# Patient Record
Sex: FEMALE | Race: BLACK OR AFRICAN AMERICAN | NOT HISPANIC OR LATINO | Employment: UNEMPLOYED | URBAN - METROPOLITAN AREA
[De-identification: names, ages, dates, MRNs, and addresses within clinical notes are randomized per-mention and may not be internally consistent; named-entity substitution may affect disease eponyms.]

---
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
## 2020-07-13 ENCOUNTER — OFFICE VISIT (OUTPATIENT)
Dept: URGENT CARE | Facility: CLINIC | Age: 20
End: 2020-07-13
Payer: COMMERCIAL

## 2020-07-13 VITALS
TEMPERATURE: 99 F | RESPIRATION RATE: 16 BRPM | SYSTOLIC BLOOD PRESSURE: 138 MMHG | OXYGEN SATURATION: 96 % | HEART RATE: 60 BPM | DIASTOLIC BLOOD PRESSURE: 86 MMHG

## 2020-07-13 DIAGNOSIS — N89.8 VAGINAL DISCHARGE: Primary | ICD-10-CM

## 2020-07-13 PROCEDURE — 99214 OFFICE O/P EST MOD 30 MIN: CPT | Mod: S$GLB,,, | Performed by: PHYSICIAN ASSISTANT

## 2020-07-13 PROCEDURE — 99214 PR OFFICE/OUTPT VISIT, EST, LEVL IV, 30-39 MIN: ICD-10-PCS | Mod: S$GLB,,, | Performed by: PHYSICIAN ASSISTANT

## 2020-07-13 RX ORDER — METRONIDAZOLE 500 MG/1
500 TABLET ORAL EVERY 12 HOURS
Qty: 14 TABLET | Refills: 0 | Status: SHIPPED | OUTPATIENT
Start: 2020-07-13 | End: 2020-07-20

## 2020-07-13 NOTE — PROGRESS NOTES
"Subjective:       Patient ID: Renée Rhoades is a 20 y.o. female.    Vitals:  temperature is 98.6 °F (37 °C). Her blood pressure is 138/86 and her pulse is 60. Her respiration is 16 and oxygen saturation is 96%.     Chief Complaint: Foreign Body in Vagina    Ms. Rhoades is a 21yo female with a PMHx of anxiety and depression who presents to the urgent care for evaluation of "vaginal issue." States that she had her menstrual cycle last week and she thinks she may have possibly forgot to take out a tampon 2 days ago as she is now having a strong vaginal order and thin white vaginal discharge. She denies pain, vaginal bleeding, abdominal pain, N/V/D, fever, chills, headaches, etc. States she wants to make sure there is not a tampon.    Foreign Body in Vagina  The incident occurred 2 days ago. Suspected object: tampon. The foreign body is suspected to be in the vagina. The incident was suspected. Pertinent negatives include no abdominal pain, chest pain, congestion, cough, fever, sore throat or vomiting.       Constitution: Negative for chills, sweating, fatigue and fever.   HENT: Negative for ear pain, congestion and sore throat.    Neck: Negative for neck pain, neck stiffness and painful lymph nodes.   Cardiovascular: Negative for chest pain and leg swelling.   Eyes: Negative for double vision and blurred vision.   Respiratory: Negative for chest tightness, cough and shortness of breath.    Gastrointestinal: Negative for abdominal pain, nausea, vomiting and diarrhea.   Genitourinary: Positive for vaginal discharge and vaginal odor. Negative for dysuria, frequency, urgency, flank pain, hematuria, history of kidney stones, vaginal pain, vaginal bleeding and pelvic pain.   Musculoskeletal: Negative for joint pain, joint swelling, muscle cramps and muscle ache.   Skin: Negative for color change, pale, rash and bruising.   Allergic/Immunologic: Negative for seasonal allergies.   Neurological: Negative for dizziness, history " of vertigo, light-headedness, passing out and headaches.   Hematologic/Lymphatic: Negative for swollen lymph nodes.   Psychiatric/Behavioral: Negative for nervous/anxious, sleep disturbance and depression. The patient is not nervous/anxious.        Objective:      Physical Exam   Constitutional: She is oriented to person, place, and time. She appears well-developed. She is cooperative.  Non-toxic appearance. She does not appear ill. No distress.   Patient is sitting pleasantly on exam table in no acute distress. Nontoxic appearing.    HENT:   Head: Normocephalic and atraumatic.   Right Ear: Hearing, tympanic membrane, external ear and ear canal normal.   Left Ear: Hearing, tympanic membrane, external ear and ear canal normal.   Nose: Nose normal. No mucosal edema, rhinorrhea or nasal deformity. No epistaxis. Right sinus exhibits no maxillary sinus tenderness and no frontal sinus tenderness. Left sinus exhibits no maxillary sinus tenderness and no frontal sinus tenderness.   Mouth/Throat: Uvula is midline, oropharynx is clear and moist and mucous membranes are normal. No trismus in the jaw. Normal dentition. No uvula swelling. No posterior oropharyngeal erythema.   Eyes: Conjunctivae and lids are normal. Right eye exhibits no discharge. Left eye exhibits no discharge. No scleral icterus.   Neck: Trachea normal, normal range of motion, full passive range of motion without pain and phonation normal. Neck supple.   Cardiovascular: Normal rate, regular rhythm, normal heart sounds and normal pulses.   Pulmonary/Chest: Effort normal and breath sounds normal. No respiratory distress.   Abdominal: Soft. Normal appearance and bowel sounds are normal. She exhibits no distension, no pulsatile midline mass and no mass. There is no abdominal tenderness.   Genitourinary: There is no rash, tenderness or lesion on the right labia. There is no rash, tenderness or lesion on the left labia. Right adnexum displays no tenderness. Left  adnexum displays no tenderness.    Vaginal discharge (milky) present.      Genitourinary Comments: No FB observed     Musculoskeletal: Normal range of motion.         General: No deformity.   Neurological: She is alert and oriented to person, place, and time. She exhibits normal muscle tone. Coordination normal.   Skin: Skin is warm, dry, intact, not diaphoretic and not pale.   Psychiatric: Her speech is normal and behavior is normal. Mood, judgment and thought content normal.   Nursing note and vitals reviewed.        Will start on flagyl pending vaginitis panel results. Advised on return/follow-up precautions. Advised on ER precautions. Answered all patient questions. Patient verbalized understanding and voiced agreement with current treatment plan.    Assessment:       1. Vaginal discharge        Plan:         Vaginal discharge  -     NuSwab Vaginitis Plus (VG+)  -     metroNIDAZOLE (FLAGYL) 500 MG tablet; Take 1 tablet (500 mg total) by mouth every 12 (twelve) hours. for 7 days  Dispense: 14 tablet; Refill: 0      Patient Instructions     You were tested for vaginal discharge today, we will call you in 5-7 days with the results of the testing  You were treated for BV today  Please take all antibiotics as directed to completion  If you need more medication when the labs result come in, we will call you and phone them in for you.    Today's testing will give no crediable information if you have unprotected sexual activities going forward.     If your condition worsens or fails to improve we recommend that you receive another evaluation at the ER immediately or contact your PCP to discuss your concerns or return here. You must understand that you've received an urgent care treatment only and that you may be released before all your medical problems are known or treated. You the patient will arrange for followup care as instructed.       Bacterial Vaginosis    You have a vaginal infection called bacterial vaginosis  (BV). Both good and bad bacteria are present in a healthy vagina. BV occurs when these bacteria get out of balance. The number of bad bacteria increase. And the number of good bacteria decrease.  BV may or may not cause symptoms. If symptoms do occur, they can include:  · Thin, gray, milky-white, or sometimes green discharge  · Unpleasant odor or fishy smell  · Itching, burning, or pain in or around the vagina  It is not known what causes BV, but certain factors can make the problem more likely. This can include:  · Douching  · Having sex with a new partner  · Having sex with more than one partner  BV will sometimes go away on its own. But treatment is usually recommended. This is because untreated BV can increase the risk of more serious health problems such as:  · Pelvic inflammatory disease (PID)  ·  delivery (giving birth to a baby early if youre pregnant)  · HIV and certain other sexually transmitted diseases (STDs)  · Infection after surgery on the reproductive organs  Home care  General care  · BV is most often treated with medicines called antibiotics. These may be given as pills or as a vaginal cream. If antibiotics are prescribed, be sure to use them exactly as directed. Also, be sure to complete all of the medicine, even if your symptoms go away.  · Avoid douching or having sex during treatment.  · If you have sex with a female partner, ask your healthcare provider if she should also be treated.  Prevention  · Limit or avoid douching.  · Avoid having sex. If you do have sex, then take steps to lower your risk:  ¨ Use condoms when having sex.  ¨ Limit the number of partners you have sex with.  Follow-up care  Follow up with your healthcare provider, or as advised.  When to seek medical advice  Call your healthcare provider right away if:  · You have a fever of 100.4ºF (38ºC) or higher, or as directed by your provider.  · Your symptoms worsen, or they dont go away within a few days of starting  treatment.  · You have new pain in the lower belly or pelvic region.  · You have side effects that bother you or a reaction to the pills or cream youre prescribed.  · You or any partners you have sex with have new symptoms, such as a rash, joint pain, or sores.  Date Last Reviewed: 7/30/2015  © 1872-8419 [employer]. [address] [place] [address]. All rights reserved. This information is not intended as a substitute for professional medical care. Always follow your healthcare professional's instructions.

## 2020-07-13 NOTE — PATIENT INSTRUCTIONS
You were tested for vaginal discharge today, we will call you in 5-7 days with the results of the testing  You were treated for BV today  Please take all antibiotics as directed to completion  If you need more medication when the labs result come in, we will call you and phone them in for you.    Today's testing will give no crediable information if you have unprotected sexual activities going forward.     If your condition worsens or fails to improve we recommend that you receive another evaluation at the ER immediately or contact your PCP to discuss your concerns or return here. You must understand that you've received an urgent care treatment only and that you may be released before all your medical problems are known or treated. You the patient will arrange for followup care as instructed.       Bacterial Vaginosis    You have a vaginal infection called bacterial vaginosis (BV). Both good and bad bacteria are present in a healthy vagina. BV occurs when these bacteria get out of balance. The number of bad bacteria increase. And the number of good bacteria decrease.  BV may or may not cause symptoms. If symptoms do occur, they can include:  · Thin, gray, milky-white, or sometimes green discharge  · Unpleasant odor or fishy smell  · Itching, burning, or pain in or around the vagina  It is not known what causes BV, but certain factors can make the problem more likely. This can include:  · Douching  · Having sex with a new partner  · Having sex with more than one partner  BV will sometimes go away on its own. But treatment is usually recommended. This is because untreated BV can increase the risk of more serious health problems such as:  · Pelvic inflammatory disease (PID)  ·  delivery (giving birth to a baby early if youre pregnant)  · HIV and certain other sexually transmitted diseases (STDs)  · Infection after surgery on the reproductive organs  Home care  General care  · BV is most often treated with  medicines called antibiotics. These may be given as pills or as a vaginal cream. If antibiotics are prescribed, be sure to use them exactly as directed. Also, be sure to complete all of the medicine, even if your symptoms go away.  · Avoid douching or having sex during treatment.  · If you have sex with a female partner, ask your healthcare provider if she should also be treated.  Prevention  · Limit or avoid douching.  · Avoid having sex. If you do have sex, then take steps to lower your risk:  ¨ Use condoms when having sex.  ¨ Limit the number of partners you have sex with.  Follow-up care  Follow up with your healthcare provider, or as advised.  When to seek medical advice  Call your healthcare provider right away if:  · You have a fever of 100.4ºF (38ºC) or higher, or as directed by your provider.  · Your symptoms worsen, or they dont go away within a few days of starting treatment.  · You have new pain in the lower belly or pelvic region.  · You have side effects that bother you or a reaction to the pills or cream youre prescribed.  · You or any partners you have sex with have new symptoms, such as a rash, joint pain, or sores.  Date Last Reviewed: 7/30/2015  © 3446-2045 [employer]. [address] [place] [address], [place] [address]. All rights reserved. This information is not intended as a substitute for professional medical care. Always follow your healthcare professional's instructions.

## 2020-07-22 ENCOUNTER — TELEPHONE (OUTPATIENT)
Dept: URGENT CARE | Facility: CLINIC | Age: 20
End: 2020-07-22

## 2020-07-22 LAB
A VAGINAE DNA VAG QL NAA+PROBE: ABNORMAL SCORE
BVAB2 DNA VAG QL NAA+PROBE: ABNORMAL SCORE
C ALBICANS DNA VAG QL NAA+PROBE: NEGATIVE
C GLABRATA DNA VAG QL NAA+PROBE: NEGATIVE
C TRACH DNA VAG QL NAA+PROBE: POSITIVE
MEGA1 DNA VAG QL NAA+PROBE: ABNORMAL SCORE
N GONORRHOEA DNA VAG QL NAA+PROBE: NEGATIVE
T VAGINALIS DNA VAG QL NAA+PROBE: NEGATIVE

## 2020-07-22 NOTE — TELEPHONE ENCOUNTER
Attempted to call patient to discuss vaginitis panel results. Patient was positive for BV (appropriately treated at visit with flagyl) and chlamydia (NOT treat at visit). Patient will need azithromycin sent into pharmacy. She will need to be educated on safe sex practices. She will need to notify sexual partners of positive results. Patient did not answer. Left voicemail to return call for test results.

## 2020-07-23 ENCOUNTER — TELEPHONE (OUTPATIENT)
Dept: URGENT CARE | Facility: CLINIC | Age: 20
End: 2020-07-23

## 2020-07-23 DIAGNOSIS — A74.9 CHLAMYDIA: Primary | ICD-10-CM

## 2020-07-23 RX ORDER — AZITHROMYCIN 250 MG/1
TABLET, FILM COATED ORAL
Qty: 4 TABLET | Refills: 0 | Status: ON HOLD | OUTPATIENT
Start: 2020-07-23 | End: 2023-06-23 | Stop reason: HOSPADM

## 2020-07-23 NOTE — TELEPHONE ENCOUNTER
Pt called back. Discussed nuswab results. She was already treated for BV but need to be treated for chlamydia. Sent azithromycin to pharmacy. Instructed her that sexual partners need to be treated. Pelvic rest for 7 days. Pt verbalized understanding and denied further questions.

## 2020-08-11 ENCOUNTER — OFFICE VISIT (OUTPATIENT)
Dept: URGENT CARE | Facility: CLINIC | Age: 20
End: 2020-08-11
Payer: COMMERCIAL

## 2020-08-11 VITALS
TEMPERATURE: 98 F | HEART RATE: 68 BPM | DIASTOLIC BLOOD PRESSURE: 67 MMHG | OXYGEN SATURATION: 97 % | RESPIRATION RATE: 16 BRPM | SYSTOLIC BLOOD PRESSURE: 136 MMHG

## 2020-08-11 DIAGNOSIS — R05.9 COUGH: ICD-10-CM

## 2020-08-11 DIAGNOSIS — R07.9 CHEST PAIN, UNSPECIFIED TYPE: Primary | ICD-10-CM

## 2020-08-11 DIAGNOSIS — R09.82 POST-NASAL DRAINAGE: ICD-10-CM

## 2020-08-11 DIAGNOSIS — R06.02 SHORTNESS OF BREATH: ICD-10-CM

## 2020-08-11 PROCEDURE — 99214 OFFICE O/P EST MOD 30 MIN: CPT | Mod: S$GLB,,, | Performed by: NURSE PRACTITIONER

## 2020-08-11 PROCEDURE — 93010 EKG 12-LEAD: ICD-10-PCS | Mod: S$GLB,,, | Performed by: INTERNAL MEDICINE

## 2020-08-11 PROCEDURE — 93010 ELECTROCARDIOGRAM REPORT: CPT | Mod: S$GLB,,, | Performed by: INTERNAL MEDICINE

## 2020-08-11 PROCEDURE — 71046 XR CHEST PA AND LATERAL: ICD-10-PCS | Mod: S$GLB,,, | Performed by: RADIOLOGY

## 2020-08-11 PROCEDURE — 71046 X-RAY EXAM CHEST 2 VIEWS: CPT | Mod: S$GLB,,, | Performed by: RADIOLOGY

## 2020-08-11 PROCEDURE — 93005 ELECTROCARDIOGRAM TRACING: CPT | Mod: S$GLB,,, | Performed by: NURSE PRACTITIONER

## 2020-08-11 PROCEDURE — 99214 PR OFFICE/OUTPT VISIT, EST, LEVL IV, 30-39 MIN: ICD-10-PCS | Mod: S$GLB,,, | Performed by: NURSE PRACTITIONER

## 2020-08-11 PROCEDURE — U0003 INFECTIOUS AGENT DETECTION BY NUCLEIC ACID (DNA OR RNA); SEVERE ACUTE RESPIRATORY SYNDROME CORONAVIRUS 2 (SARS-COV-2) (CORONAVIRUS DISEASE [COVID-19]), AMPLIFIED PROBE TECHNIQUE, MAKING USE OF HIGH THROUGHPUT TECHNOLOGIES AS DESCRIBED BY CMS-2020-01-R: HCPCS

## 2020-08-11 PROCEDURE — 93005 EKG 12-LEAD: ICD-10-PCS | Mod: S$GLB,,, | Performed by: NURSE PRACTITIONER

## 2020-08-11 RX ORDER — FLUTICASONE PROPIONATE 50 MCG
1 SPRAY, SUSPENSION (ML) NASAL 2 TIMES DAILY
Qty: 15.8 ML | Refills: 0 | Status: ON HOLD | OUTPATIENT
Start: 2020-08-11 | End: 2023-06-23 | Stop reason: HOSPADM

## 2020-08-11 NOTE — PROGRESS NOTES
Subjective:       Patient ID: Renée Rhoades is a 20 y.o. female.    Vitals:  temperature is 98.2 °F (36.8 °C). Her blood pressure is 136/67 and her pulse is 68. Her respiration is 16 and oxygen saturation is 97%.     Chief Complaint: URI and Chest Pain    Pt reports SOB and chest pain x 1-2 months. Symptoms happen only when lying down flat. She has sharp pain in L chest area that resolves within several minutes but SOB persists. She does endorse some SOB with exertion as well. No hx of asthma or lung problems. She also reports post-nasal drip and mucous for the past 1-2 months as well. Mild cough but mostly to clear her throat of mucous. No fever, chills, sore throat, abd pain, n/v/d, anosmia. She wants to be tested for covid today.     URI   This is a new problem. The current episode started 1 to 4 weeks ago. The problem has been unchanged. There has been no fever. Associated symptoms include congestion and coughing. Pertinent negatives include no chest pain, diarrhea, dysuria, headaches, nausea, rash, sore throat or vomiting. Treatments tried: cough drops, cough suppressant.       Constitution: Negative for chills, fatigue and fever.   HENT: Positive for congestion. Negative for sore throat.    Neck: Negative for painful lymph nodes.   Cardiovascular: Negative for chest pain and leg swelling.   Eyes: Negative for double vision and blurred vision.   Respiratory: Positive for cough and shortness of breath.    Gastrointestinal: Negative for nausea, vomiting and diarrhea.   Genitourinary: Negative for dysuria, frequency, urgency and history of kidney stones.   Musculoskeletal: Negative for joint pain, joint swelling, muscle cramps and muscle ache.   Skin: Negative for color change, pale, rash and bruising.   Allergic/Immunologic: Negative for seasonal allergies.   Neurological: Negative for dizziness, history of vertigo, light-headedness, passing out and headaches.   Hematologic/Lymphatic: Negative for swollen lymph  nodes.   Psychiatric/Behavioral: Negative for nervous/anxious, sleep disturbance and depression. The patient is not nervous/anxious.        Objective:      Physical Exam   Constitutional: She is oriented to person, place, and time. She appears well-developed. She is cooperative.  Non-toxic appearance. She does not appear ill. No distress.   HENT:   Head: Normocephalic and atraumatic.   Ears:   Right Ear: Hearing, tympanic membrane, external ear and ear canal normal.   Left Ear: Hearing, tympanic membrane, external ear and ear canal normal.   Nose: Nose normal. No mucosal edema, rhinorrhea or nasal deformity. No epistaxis. Right sinus exhibits no maxillary sinus tenderness and no frontal sinus tenderness. Left sinus exhibits no maxillary sinus tenderness and no frontal sinus tenderness.   Mouth/Throat: Uvula is midline, oropharynx is clear and moist and mucous membranes are normal. No trismus in the jaw. Normal dentition. No uvula swelling. Cobblestoning present. No oropharyngeal exudate, posterior oropharyngeal edema or posterior oropharyngeal erythema.   Eyes: Pupils are equal, round, and reactive to light. Conjunctivae and lids are normal. No scleral icterus. extraocular movement intact  Neck: Trachea normal, full passive range of motion without pain and phonation normal. Neck supple. No neck rigidity. No edema and no erythema present.   Cardiovascular: Normal rate, regular rhythm, normal heart sounds and normal pulses.   Pulmonary/Chest: Effort normal and breath sounds normal. No respiratory distress. She has no decreased breath sounds. She has no wheezes. She has no rhonchi. She has no rales.   Abdominal: Normal appearance.   Musculoskeletal: Normal range of motion.         General: No deformity.   Neurological: She is alert and oriented to person, place, and time. She exhibits normal muscle tone. Coordination normal.   Skin: Skin is warm, dry, intact, not diaphoretic and not pale. Psychiatric: Her speech is  normal and behavior is normal. Judgment and thought content normal.   Nursing note and vitals reviewed.  X-ray Chest Pa And Lateral    Result Date: 8/11/2020  EXAMINATION: XR CHEST PA AND LATERAL CLINICAL HISTORY: Chest pain, unspecified TECHNIQUE: PA and lateral views of the chest were performed. COMPARISON: Chest 08/01/2018 FINDINGS: Cardiomediastinal silhouette is within normal limits.  There is no tracheal abnormality. Lungs are well inflated.  There is no lobar consolidations or pneumothorax or pulmonary vascular congestion or pleural effusions.  There is no significant bony thorax abnormalities.     No acute cardiopulmonary process. Electronically signed by: Cristian Gardner MD Date:    08/11/2020 Time:    10:59    EKG preliminary read: Sinus bradycardia with sinus arrhythmia       Assessment:       1. Chest pain, unspecified type    2. Shortness of breath    3. Cough    4. Post-nasal drainage        Plan:         Chest pain, unspecified type  -     X-Ray Chest PA And Lateral; Future; Expected date: 08/11/2020  -     EKG 12-lead  -     Ambulatory referral/consult to Cardiology    Shortness of breath  -     COVID-19 Routine Screening    Cough  -     fluticasone propionate (FLONASE) 50 mcg/actuation nasal spray; 1 spray (50 mcg total) by Each Nostril route 2 (two) times daily.  Dispense: 15.8 mL; Refill: 0  -     COVID-19 Routine Screening    Post-nasal drainage  -     fluticasone propionate (FLONASE) 50 mcg/actuation nasal spray; 1 spray (50 mcg total) by Each Nostril route 2 (two) times daily.  Dispense: 15.8 mL; Refill: 0         Reviewed previous pertinent office visits, PMH, PSH, fam hx  Referral placed to cardiology  covid test pending. Isolation guidelines discussed.  Start flonase for post-nasal drainage  Advised on return/follow-up precautions. Advised on ER precautions. Answered all patient questions. Patient verbalized understanding and voiced agreement with current treatment plan.    Patient Instructions    Referral to cardiology placed.  Start flonase  Go to the ER if you develop any worsening chest pain    Instructions for Patients with Confirmed or Suspected COVID-19    If you are awaiting your test result, you will either be called or it will be released to the patient portal.  If you have any questions about your test, please visit [email or web address] or call our COVID-19 information line at [phone].      Instructions for non-hospitalized or discharged patients with confirmed or suspected COVID-19:       Stay home except to get medical care.    Separate yourself from other people and animals in your home.    Call ahead before visiting your doctor.    Wear a face mask.    Cover your coughs and sneezes.    Clean your hands often.    Avoid sharing personal household items.    Clean all high-touch surfaces every day.    Monitor your symptoms. Seek prompt medical attention if your illness is worsening (e.g., difficulty breathing). Before seeking care, call your healthcare provider.    If you have a medical emergency and must call 911, notify the dispatcher that you have or are being evaluated for COVID-19. If possible, put on a face mask before emergency medical services arrive.    Use the following symptom-based strategy to return to normal activity following a suspected or confirmed case of COVID-19. Continue isolation until:   o At least 3 days (72 hours) have passed since recovery defined as resolution of fever without the use of fever-reducing medications and improvement in respiratory symptoms (e.g. cough, shortness of breath), and   o At least 10 days have passed since the first positive test.       As one of the next steps, you will receive a call or text from the [place] Department of Health ([facility]) COVID-19 Tracing Team. See the contact information below so you know not to ignore the health departments call. It is important that you contact them back immediately so they can  help.     Contact Tracer Number:  [phone]  Caller ID for most carriers: [facility]    What is contact tracing?   Contact tracing is a process that helps identify everyone who has been in close contact with an infected person. Contact tracers let those people know they may have been exposed and guide them on next steps. Confidentiality is important for everyone; no one will be told who may have exposed them to the virus.   Your involvement is important. The more we know about where and how this virus is spreading, the better chance we have at stopping it from spreading further.  What does exposure mean?   Exposure means you have been within 6 feet for more than 15 minutes with a person who has or had COVID-19.  What kind of questions do the contact tracers ask?   A contact tracer will confirm your basic contact information including name, address, phone number, and next of kin, as well as asking about any symptoms you may have had. Theyll also ask you how you think you may have gotten sick, such as places where you may have been exposed to the virus, and people you were with. Those names will never be shared with anyone outside of that call, and will only be used to help trace and stop the spread of the virus.   I have privacy concerns. How will the state use my information?   Your privacy about your health is important. All calls are completed using call centers that use the appropriate health privacy protection measures (HIPAA compliance), meaning that your patient information is safe. No one will ever ask you any questions related to immigration status. Your health comes first.   Do I have to participate?   You do not have to participate, but we strongly encourage you to. Contact tracing can help us catch and control new outbreaks as theyre developing to keep your friends and family safe.   What if I dont hear from anyone?   If you dont receive a call within 24 hours, you can call the number  above right away to inquire about your status. That line is open from 8:00 am - 8:00 p.m., 7 days a week.  Contact tracing saves lives! Together, we have the power to beat this virus and keep our loved ones and neighbors safe.       Instructions for household members, intimate partners and caregivers in a non-healthcare setting of a patient with confirmed or suspected COVID-19:         Close contacts should monitor their health and call their healthcare provider right away if they develop symptoms suggestive of COVID-19 (e.g., fever, cough, shortness of breath).    Stay home except to get medical care. Separate yourself from other people and animals in the home.   Monitor the patients symptoms. If the patient is getting sicker, call his or her healthcare provider. If the patient has a medical emergency and you need to call 911, notify the dispatch personnel that the patient has or is being evaluated for COVID-19.    Wear a facemask when around other people such as sharing a room or vehicle and before entering a healthcare provider's office.   Cover coughs and sneezes with a tissue. Throw used tissues in a lined trash can immediately and wash hands.   Clean hands often with soap and water for at least 20 seconds or with an alcohol-based hand , rubbing hands together until they feel dry. Avoid touching your eyes, nose, and mouth with unwashed hands.   Clean all high-touch; surfaces every day, including counters, tabletops, doorknobs, bathroom fixtures, toilets, phones, keyboards, tablets, bedside tables, etc. Use a household cleaning spray or wipe according to label instructions.   Avoid sharing personal household items such as dishes, drinking glasses, cups, towels, bedding, etc. After these items are used, they should be washed thoroughly with soap and water.   Continue isolation until:   At least 3 days (72 hours) have passed since recovery defined as resolution of fever without the use of  fever-reducing medications and improvement in respiratory symptoms (e.g. cough, shortness of breath), and    At least 10 days have passed since the patients first positive test.    https://www.cdc.gov/coronavirus/2019-ncov/your-health/index.htm

## 2020-08-11 NOTE — PATIENT INSTRUCTIONS
Referral to cardiology placed.  Start flonase  Go to the ER if you develop any worsening chest pain    Instructions for Patients with Confirmed or Suspected COVID-19    If you are awaiting your test result, you will either be called or it will be released to the patient portal.  If you have any questions about your test, please visit [email or web address] or call our COVID-19 information line at [phone].      Instructions for non-hospitalized or discharged patients with confirmed or suspected COVID-19:       Stay home except to get medical care.    Separate yourself from other people and animals in your home.    Call ahead before visiting your doctor.    Wear a face mask.    Cover your coughs and sneezes.    Clean your hands often.    Avoid sharing personal household items.    Clean all high-touch surfaces every day.    Monitor your symptoms. Seek prompt medical attention if your illness is worsening (e.g., difficulty breathing). Before seeking care, call your healthcare provider.    If you have a medical emergency and must call 911, notify the dispatcher that you have or are being evaluated for COVID-19. If possible, put on a face mask before emergency medical services arrive.    Use the following symptom-based strategy to return to normal activity following a suspected or confirmed case of COVID-19. Continue isolation until:   o At least 3 days (72 hours) have passed since recovery defined as resolution of fever without the use of fever-reducing medications and improvement in respiratory symptoms (e.g. cough, shortness of breath), and   o At least 10 days have passed since the first positive test.       As one of the next steps, you will receive a call or text from the [place] Department of Health ([facility]) COVID-19 Tracing Team. See the contact information below so you know not to ignore the health departments call. It is important that you contact them back immediately so they can help.      Contact Tracer Number:  [phone]  Caller ID for most carriers: [facility]    What is contact tracing?   Contact tracing is a process that helps identify everyone who has been in close contact with an infected person. Contact tracers let those people know they may have been exposed and guide them on next steps. Confidentiality is important for everyone; no one will be told who may have exposed them to the virus.   Your involvement is important. The more we know about where and how this virus is spreading, the better chance we have at stopping it from spreading further.  What does exposure mean?   Exposure means you have been within 6 feet for more than 15 minutes with a person who has or had COVID-19.  What kind of questions do the contact tracers ask?   A contact tracer will confirm your basic contact information including name, address, phone number, and next of kin, as well as asking about any symptoms you may have had. Theyll also ask you how you think you may have gotten sick, such as places where you may have been exposed to the virus, and people you were with. Those names will never be shared with anyone outside of that call, and will only be used to help trace and stop the spread of the virus.   I have privacy concerns. How will the state use my information?   Your privacy about your health is important. All calls are completed using call centers that use the appropriate health privacy protection measures (HIPAA compliance), meaning that your patient information is safe. No one will ever ask you any questions related to immigration status. Your health comes first.   Do I have to participate?   You do not have to participate, but we strongly encourage you to. Contact tracing can help us catch and control new outbreaks as theyre developing to keep your friends and family safe.   What if I dont hear from anyone?   If you dont receive a call within 24 hours, you can call the number above  right away to inquire about your status. That line is open from 8:00 am - 8:00 p.m., 7 days a week.  Contact tracing saves lives! Together, we have the power to beat this virus and keep our loved ones and neighbors safe.       Instructions for household members, intimate partners and caregivers in a non-healthcare setting of a patient with confirmed or suspected COVID-19:         Close contacts should monitor their health and call their healthcare provider right away if they develop symptoms suggestive of COVID-19 (e.g., fever, cough, shortness of breath).    Stay home except to get medical care. Separate yourself from other people and animals in the home.   Monitor the patients symptoms. If the patient is getting sicker, call his or her healthcare provider. If the patient has a medical emergency and you need to call 911, notify the dispatch personnel that the patient has or is being evaluated for COVID-19.    Wear a facemask when around other people such as sharing a room or vehicle and before entering a healthcare provider's office.   Cover coughs and sneezes with a tissue. Throw used tissues in a lined trash can immediately and wash hands.   Clean hands often with soap and water for at least 20 seconds or with an alcohol-based hand , rubbing hands together until they feel dry. Avoid touching your eyes, nose, and mouth with unwashed hands.   Clean all high-touch; surfaces every day, including counters, tabletops, doorknobs, bathroom fixtures, toilets, phones, keyboards, tablets, bedside tables, etc. Use a household cleaning spray or wipe according to label instructions.   Avoid sharing personal household items such as dishes, drinking glasses, cups, towels, bedding, etc. After these items are used, they should be washed thoroughly with soap and water.   Continue isolation until:   At least 3 days (72 hours) have passed since recovery defined as resolution of fever without the use of  fever-reducing medications and improvement in respiratory symptoms (e.g. cough, shortness of breath), and    At least 10 days have passed since the patients first positive test.    https://www.cdc.gov/coronavirus/2019-ncov/your-health/index.htm

## 2020-08-12 ENCOUNTER — TELEPHONE (OUTPATIENT)
Dept: URGENT CARE | Facility: CLINIC | Age: 20
End: 2020-08-12

## 2020-08-12 LAB — SARS-COV-2 RNA RESP QL NAA+PROBE: NOT DETECTED

## 2020-08-12 NOTE — TELEPHONE ENCOUNTER
----- Message from Dawna Valentine PA-C sent at 8/12/2020  8:05 AM CDT -----  COVID-19 negative. Follow up with PCP as needed.

## 2021-08-11 ENCOUNTER — CLINICAL SUPPORT (OUTPATIENT)
Dept: URGENT CARE | Facility: CLINIC | Age: 21
End: 2021-08-11
Payer: COMMERCIAL

## 2021-08-11 DIAGNOSIS — Z11.52 ENCOUNTER FOR SCREENING LABORATORY TESTING FOR COVID-19 VIRUS: Primary | ICD-10-CM

## 2021-08-11 LAB
CTP QC/QA: YES
SARS-COV-2 RDRP RESP QL NAA+PROBE: NEGATIVE

## 2021-08-11 PROCEDURE — U0002: ICD-10-PCS | Mod: QW,S$GLB,, | Performed by: PHYSICIAN ASSISTANT

## 2021-08-11 PROCEDURE — U0002 COVID-19 LAB TEST NON-CDC: HCPCS | Mod: QW,S$GLB,, | Performed by: PHYSICIAN ASSISTANT

## 2021-08-13 ENCOUNTER — TELEPHONE (OUTPATIENT)
Dept: URGENT CARE | Facility: CLINIC | Age: 21
End: 2021-08-13

## 2021-08-13 ENCOUNTER — CLINICAL SUPPORT (OUTPATIENT)
Dept: URGENT CARE | Facility: CLINIC | Age: 21
End: 2021-08-13
Payer: COMMERCIAL

## 2021-08-13 DIAGNOSIS — U07.1 COVID-19 VIRUS INFECTION: Primary | ICD-10-CM

## 2021-08-13 DIAGNOSIS — Z11.52 ENCOUNTER FOR SCREENING LABORATORY TESTING FOR COVID-19 VIRUS: Primary | ICD-10-CM

## 2021-08-13 LAB
CTP QC/QA: YES
SARS-COV-2 RDRP RESP QL NAA+PROBE: POSITIVE

## 2021-08-13 PROCEDURE — U0002 COVID-19 LAB TEST NON-CDC: HCPCS | Mod: QW,S$GLB,, | Performed by: PHYSICIAN ASSISTANT

## 2021-08-13 PROCEDURE — U0002: ICD-10-PCS | Mod: QW,S$GLB,, | Performed by: PHYSICIAN ASSISTANT

## 2022-12-09 ENCOUNTER — INITIAL PRENATAL (OUTPATIENT)
Dept: OBSTETRICS AND GYNECOLOGY | Facility: CLINIC | Age: 22
End: 2022-12-09
Payer: COMMERCIAL

## 2022-12-09 ENCOUNTER — CLINICAL SUPPORT (OUTPATIENT)
Dept: OBSTETRICS AND GYNECOLOGY | Facility: CLINIC | Age: 22
End: 2022-12-09
Payer: COMMERCIAL

## 2022-12-09 ENCOUNTER — LAB VISIT (OUTPATIENT)
Dept: LAB | Facility: HOSPITAL | Age: 22
End: 2022-12-09
Attending: OBSTETRICS & GYNECOLOGY
Payer: COMMERCIAL

## 2022-12-09 VITALS
BODY MASS INDEX: 36.12 KG/M2 | SYSTOLIC BLOOD PRESSURE: 122 MMHG | DIASTOLIC BLOOD PRESSURE: 64 MMHG | WEIGHT: 217.06 LBS

## 2022-12-09 DIAGNOSIS — Z32.01 POSITIVE URINE PREGNANCY TEST: ICD-10-CM

## 2022-12-09 DIAGNOSIS — Z32.01 POSITIVE URINE PREGNANCY TEST: Primary | ICD-10-CM

## 2022-12-09 DIAGNOSIS — Z23 FLU VACCINE NEED: Primary | ICD-10-CM

## 2022-12-09 DIAGNOSIS — Z23 NEEDS FLU SHOT: ICD-10-CM

## 2022-12-09 LAB
ABO + RH BLD: NORMAL
BASOPHILS # BLD AUTO: 0.01 K/UL (ref 0–0.2)
BASOPHILS NFR BLD: 0.2 % (ref 0–1.9)
BLD GP AB SCN CELLS X3 SERPL QL: NORMAL
DIFFERENTIAL METHOD: ABNORMAL
EOSINOPHIL # BLD AUTO: 0 K/UL (ref 0–0.5)
EOSINOPHIL NFR BLD: 0.6 % (ref 0–8)
ERYTHROCYTE [DISTWIDTH] IN BLOOD BY AUTOMATED COUNT: 13.8 % (ref 11.5–14.5)
ESTIMATED AVG GLUCOSE: 82 MG/DL (ref 68–131)
HBA1C MFR BLD: 4.5 % (ref 4–5.6)
HBV SURFACE AG SERPL QL IA: NORMAL
HCT VFR BLD AUTO: 33.9 % (ref 37–48.5)
HCV AB SERPL QL IA: NORMAL
HGB BLD-MCNC: 11.4 G/DL (ref 12–16)
HGB S BLD QL SOLY: NEGATIVE
HIV 1+2 AB+HIV1 P24 AG SERPL QL IA: NORMAL
IMM GRANULOCYTES # BLD AUTO: 0.01 K/UL (ref 0–0.04)
IMM GRANULOCYTES NFR BLD AUTO: 0.2 % (ref 0–0.5)
LYMPHOCYTES # BLD AUTO: 1.9 K/UL (ref 1–4.8)
LYMPHOCYTES NFR BLD: 30.5 % (ref 18–48)
MCH RBC QN AUTO: 26 PG (ref 27–31)
MCHC RBC AUTO-ENTMCNC: 33.6 G/DL (ref 32–36)
MCV RBC AUTO: 77 FL (ref 82–98)
MONOCYTES # BLD AUTO: 0.6 K/UL (ref 0.3–1)
MONOCYTES NFR BLD: 9.8 % (ref 4–15)
NEUTROPHILS # BLD AUTO: 3.7 K/UL (ref 1.8–7.7)
NEUTROPHILS NFR BLD: 58.7 % (ref 38–73)
NRBC BLD-RTO: 0 /100 WBC
PLATELET # BLD AUTO: 272 K/UL (ref 150–450)
PMV BLD AUTO: 10 FL (ref 9.2–12.9)
RBC # BLD AUTO: 4.39 M/UL (ref 4–5.4)
WBC # BLD AUTO: 6.23 K/UL (ref 3.9–12.7)

## 2022-12-09 PROCEDURE — 99999 PR PBB SHADOW E&M-EST. PATIENT-LVL II: CPT | Mod: PBBFAC,,, | Performed by: OBSTETRICS & GYNECOLOGY

## 2022-12-09 PROCEDURE — 86901 BLOOD TYPING SEROLOGIC RH(D): CPT | Performed by: OBSTETRICS & GYNECOLOGY

## 2022-12-09 PROCEDURE — 90686 FLU VACCINE (QUAD) GREATER THAN OR EQUAL TO 3YO PRESERVATIVE FREE IM: ICD-10-PCS | Mod: S$GLB,,, | Performed by: OBSTETRICS & GYNECOLOGY

## 2022-12-09 PROCEDURE — 99999 PR PBB SHADOW E&M-EST. PATIENT-LVL II: ICD-10-PCS | Mod: PBBFAC,,, | Performed by: OBSTETRICS & GYNECOLOGY

## 2022-12-09 PROCEDURE — 85025 COMPLETE CBC W/AUTO DIFF WBC: CPT | Performed by: OBSTETRICS & GYNECOLOGY

## 2022-12-09 PROCEDURE — 86803 HEPATITIS C AB TEST: CPT | Performed by: OBSTETRICS & GYNECOLOGY

## 2022-12-09 PROCEDURE — 90686 IIV4 VACC NO PRSV 0.5 ML IM: CPT | Mod: S$GLB,,, | Performed by: OBSTETRICS & GYNECOLOGY

## 2022-12-09 PROCEDURE — 87491 CHLMYD TRACH DNA AMP PROBE: CPT | Performed by: OBSTETRICS & GYNECOLOGY

## 2022-12-09 PROCEDURE — 86592 SYPHILIS TEST NON-TREP QUAL: CPT | Performed by: OBSTETRICS & GYNECOLOGY

## 2022-12-09 PROCEDURE — 87086 URINE CULTURE/COLONY COUNT: CPT | Performed by: OBSTETRICS & GYNECOLOGY

## 2022-12-09 PROCEDURE — 90471 IMMUNIZATION ADMIN: CPT | Mod: S$GLB,,, | Performed by: OBSTETRICS & GYNECOLOGY

## 2022-12-09 PROCEDURE — 99203 PR OFFICE/OUTPT VISIT, NEW, LEVL III, 30-44 MIN: ICD-10-PCS | Mod: 25,S$GLB,, | Performed by: OBSTETRICS & GYNECOLOGY

## 2022-12-09 PROCEDURE — 87591 N.GONORRHOEAE DNA AMP PROB: CPT | Performed by: OBSTETRICS & GYNECOLOGY

## 2022-12-09 PROCEDURE — 36415 COLL VENOUS BLD VENIPUNCTURE: CPT | Performed by: OBSTETRICS & GYNECOLOGY

## 2022-12-09 PROCEDURE — 99999 PR PBB SHADOW E&M-EST. PATIENT-LVL I: ICD-10-PCS | Mod: PBBFAC,,,

## 2022-12-09 PROCEDURE — 99203 OFFICE O/P NEW LOW 30 MIN: CPT | Mod: 25,S$GLB,, | Performed by: OBSTETRICS & GYNECOLOGY

## 2022-12-09 PROCEDURE — 83036 HEMOGLOBIN GLYCOSYLATED A1C: CPT | Performed by: OBSTETRICS & GYNECOLOGY

## 2022-12-09 PROCEDURE — 99999 PR PBB SHADOW E&M-EST. PATIENT-LVL I: CPT | Mod: PBBFAC,,,

## 2022-12-09 PROCEDURE — 86762 RUBELLA ANTIBODY: CPT | Performed by: OBSTETRICS & GYNECOLOGY

## 2022-12-09 PROCEDURE — 87340 HEPATITIS B SURFACE AG IA: CPT | Performed by: OBSTETRICS & GYNECOLOGY

## 2022-12-09 PROCEDURE — 85660 RBC SICKLE CELL TEST: CPT | Performed by: OBSTETRICS & GYNECOLOGY

## 2022-12-09 PROCEDURE — 87389 HIV-1 AG W/HIV-1&-2 AB AG IA: CPT | Performed by: OBSTETRICS & GYNECOLOGY

## 2022-12-09 PROCEDURE — 90471 FLU VACCINE (QUAD) GREATER THAN OR EQUAL TO 3YO PRESERVATIVE FREE IM: ICD-10-PCS | Mod: S$GLB,,, | Performed by: OBSTETRICS & GYNECOLOGY

## 2022-12-09 NOTE — PROGRESS NOTES
Subjective:       Patient ID: Renée Rhoades is a 22 y.o. female.    Chief Complaint:  No chief complaint on file.      History of Present Illness  HPI  Missed Menses/ Possible Pregnancy  Patient complains of amenorrhea. She believes she could be pregnant. Pregnancy is desired. Sexual Activity: single partner, contraception: none. Current symptoms also include: positive home pregnancy test. Last period was normal.     Patient's last menstrual period was 2022.     GYN & OB History  Patient's last menstrual period was 2022.   Date of Last Pap: No result found    OB History    Para Term  AB Living   1             SAB IAB Ectopic Multiple Live Births                  # Outcome Date GA Lbr Manoj/2nd Weight Sex Delivery Anes PTL Lv   1 Current                Review of Systems  Review of Systems   Constitutional: Negative.    Respiratory: Negative.     Cardiovascular: Negative.    Gastrointestinal: Negative.    Endocrine: Negative.    Genitourinary: Negative.    Musculoskeletal: Negative.    Neurological: Negative.    Hematological: Negative.    Psychiatric/Behavioral: Negative.     Breast: negative.          Objective:    Physical Exam:   Constitutional: She is oriented to person, place, and time. She appears well-developed and well-nourished. No distress.    HENT:   Head: Normocephalic and atraumatic.     Neck: No thyromegaly present.     Pulmonary/Chest: Effort normal. No respiratory distress.        Abdominal: Soft. She exhibits no distension and no mass. There is no abdominal tenderness. There is no rebound and no guarding.             Musculoskeletal: Normal range of motion and moves all extremeties. No tenderness.       Neurological: She is alert and oriented to person, place, and time. No cranial nerve deficit. Coordination normal.    Skin: She is not diaphoretic.    Psychiatric: She has a normal mood and affect. Her behavior is normal. Judgment and thought content normal.         Assessment:        1. Positive urine pregnancy test    2. Needs flu shot               Plan:      Pt already taking PNV  PN labs and dating sono ordered  F/u 4 wks

## 2022-12-10 LAB — RPR SER QL: NORMAL

## 2022-12-11 LAB
BACTERIA UR CULT: NORMAL
C TRACH DNA SPEC QL NAA+PROBE: NOT DETECTED
N GONORRHOEA DNA SPEC QL NAA+PROBE: NOT DETECTED

## 2022-12-12 LAB
RUBV IGG SER-ACNC: 17.6 IU/ML
RUBV IGG SER-IMP: REACTIVE

## 2022-12-13 ENCOUNTER — PATIENT MESSAGE (OUTPATIENT)
Dept: MATERNAL FETAL MEDICINE | Facility: CLINIC | Age: 22
End: 2022-12-13
Payer: COMMERCIAL

## 2022-12-14 ENCOUNTER — PROCEDURE VISIT (OUTPATIENT)
Dept: MATERNAL FETAL MEDICINE | Facility: CLINIC | Age: 22
End: 2022-12-14
Payer: COMMERCIAL

## 2022-12-14 DIAGNOSIS — Z36.89 ENCOUNTER FOR FETAL ANATOMIC SURVEY: Primary | ICD-10-CM

## 2022-12-14 DIAGNOSIS — Z32.01 POSITIVE URINE PREGNANCY TEST: ICD-10-CM

## 2022-12-14 PROCEDURE — 76801 US OB/GYN EXTENDED PROCEDURE (VIEWPOINT): ICD-10-PCS | Mod: S$GLB,,, | Performed by: OBSTETRICS & GYNECOLOGY

## 2022-12-14 PROCEDURE — 76801 OB US < 14 WKS SINGLE FETUS: CPT | Mod: S$GLB,,, | Performed by: OBSTETRICS & GYNECOLOGY

## 2023-01-05 ENCOUNTER — PATIENT MESSAGE (OUTPATIENT)
Dept: OTHER | Facility: OTHER | Age: 23
End: 2023-01-05
Payer: COMMERCIAL

## 2023-01-09 ENCOUNTER — ROUTINE PRENATAL (OUTPATIENT)
Dept: OBSTETRICS AND GYNECOLOGY | Facility: CLINIC | Age: 23
End: 2023-01-09
Payer: COMMERCIAL

## 2023-01-09 ENCOUNTER — PATIENT MESSAGE (OUTPATIENT)
Dept: ADMINISTRATIVE | Facility: OTHER | Age: 23
End: 2023-01-09
Payer: COMMERCIAL

## 2023-01-09 ENCOUNTER — LAB VISIT (OUTPATIENT)
Dept: LAB | Facility: HOSPITAL | Age: 23
End: 2023-01-09
Attending: OBSTETRICS & GYNECOLOGY
Payer: COMMERCIAL

## 2023-01-09 VITALS
SYSTOLIC BLOOD PRESSURE: 120 MMHG | DIASTOLIC BLOOD PRESSURE: 74 MMHG | WEIGHT: 220.88 LBS | BODY MASS INDEX: 36.76 KG/M2

## 2023-01-09 DIAGNOSIS — Z3A.17 17 WEEKS GESTATION OF PREGNANCY: Primary | ICD-10-CM

## 2023-01-09 DIAGNOSIS — Z34.02 ENCOUNTER FOR SUPERVISION OF NORMAL FIRST PREGNANCY IN SECOND TRIMESTER: ICD-10-CM

## 2023-01-09 DIAGNOSIS — Z34.02 ENCOUNTER FOR SUPERVISION OF NORMAL PRIMIGRAVIDA IN SECOND TRIMESTER, ANTEPARTUM: ICD-10-CM

## 2023-01-09 PROCEDURE — 0502F PR SUBSEQUENT PRENATAL CARE: ICD-10-PCS | Mod: CPTII,S$GLB,, | Performed by: OBSTETRICS & GYNECOLOGY

## 2023-01-09 PROCEDURE — 99999 PR PBB SHADOW E&M-EST. PATIENT-LVL II: ICD-10-PCS | Mod: PBBFAC,,, | Performed by: OBSTETRICS & GYNECOLOGY

## 2023-01-09 PROCEDURE — 81511 FTL CGEN ABNOR FOUR ANAL: CPT | Performed by: OBSTETRICS & GYNECOLOGY

## 2023-01-09 PROCEDURE — 36415 COLL VENOUS BLD VENIPUNCTURE: CPT | Performed by: OBSTETRICS & GYNECOLOGY

## 2023-01-09 PROCEDURE — 99999 PR PBB SHADOW E&M-EST. PATIENT-LVL II: CPT | Mod: PBBFAC,,, | Performed by: OBSTETRICS & GYNECOLOGY

## 2023-01-09 PROCEDURE — 0502F SUBSEQUENT PRENATAL CARE: CPT | Mod: CPTII,S$GLB,, | Performed by: OBSTETRICS & GYNECOLOGY

## 2023-01-09 NOTE — PROGRESS NOTES
Pt having some HA, mostly relieved when pt drinks caffeine.  Pt having some pelvic pressure.  Pt desires quad scr today.    Assessment/Plan  1. 17 weeks gestation of pregnancy    2. Encounter for supervision of normal primigravida in second trimester, antepartum      20 minutes spend in total time reviewing labs, prior sonos and previous visits

## 2023-01-11 LAB
# FETUSES US: NORMAL
2ND TRIMESTER 4 SCREEN PNL SERPL: NEGATIVE
2ND TRIMESTER 4 SCREEN SERPL-IMP: NORMAL
AFP MOM SERPL: 1.85
AFP SERPL-MCNC: 60 NG/ML
AGE AT DELIVERY: 23
B-HCG MOM SERPL: 0.87
B-HCG SERPL-ACNC: 18.6 IU/ML
FET TS 21 RISK FROM MAT AGE: NORMAL
GA (DAYS): 1 D
GA (WEEKS): 17 WK
GA METHOD: NORMAL
IDDM PATIENT QL: NORMAL
INHIBIN A MOM SERPL: 0.81
INHIBIN A SERPL-MCNC: 99.6 PG/ML
SMOKING STATUS FTND: NO
TS 18 RISK FETUS: NORMAL
TS 21 RISK FETUS: NORMAL
U ESTRIOL MOM SERPL: 0.97
U ESTRIOL SERPL-MCNC: 1.17 NG/ML

## 2023-01-12 ENCOUNTER — PATIENT MESSAGE (OUTPATIENT)
Dept: OBSTETRICS AND GYNECOLOGY | Facility: CLINIC | Age: 23
End: 2023-01-12
Payer: COMMERCIAL

## 2023-01-25 ENCOUNTER — PATIENT MESSAGE (OUTPATIENT)
Dept: MATERNAL FETAL MEDICINE | Facility: CLINIC | Age: 23
End: 2023-01-25
Payer: COMMERCIAL

## 2023-01-26 ENCOUNTER — PROCEDURE VISIT (OUTPATIENT)
Dept: MATERNAL FETAL MEDICINE | Facility: CLINIC | Age: 23
End: 2023-01-26
Payer: COMMERCIAL

## 2023-01-26 DIAGNOSIS — Z36.2 ENCOUNTER FOR FOLLOW-UP ULTRASOUND OF FETAL ANATOMY: Primary | ICD-10-CM

## 2023-01-26 DIAGNOSIS — Z36.89 ENCOUNTER FOR FETAL ANATOMIC SURVEY: ICD-10-CM

## 2023-01-26 PROCEDURE — 76811 US MFM PROCEDURE (VIEWPOINT): ICD-10-PCS | Mod: S$GLB,,, | Performed by: OBSTETRICS & GYNECOLOGY

## 2023-01-26 PROCEDURE — 76811 OB US DETAILED SNGL FETUS: CPT | Mod: S$GLB,,, | Performed by: OBSTETRICS & GYNECOLOGY

## 2023-01-29 ENCOUNTER — PATIENT MESSAGE (OUTPATIENT)
Dept: OTHER | Facility: OTHER | Age: 23
End: 2023-01-29
Payer: COMMERCIAL

## 2023-02-07 ENCOUNTER — ROUTINE PRENATAL (OUTPATIENT)
Dept: OBSTETRICS AND GYNECOLOGY | Facility: CLINIC | Age: 23
End: 2023-02-07
Payer: COMMERCIAL

## 2023-02-07 VITALS
WEIGHT: 222.69 LBS | BODY MASS INDEX: 37.05 KG/M2 | DIASTOLIC BLOOD PRESSURE: 74 MMHG | SYSTOLIC BLOOD PRESSURE: 114 MMHG

## 2023-02-07 DIAGNOSIS — Z34.02 ENCOUNTER FOR SUPERVISION OF NORMAL PRIMIGRAVIDA IN SECOND TRIMESTER, ANTEPARTUM: Primary | ICD-10-CM

## 2023-02-07 DIAGNOSIS — O99.212 OBESITY COMPLICATING PREGNANCY, SECOND TRIMESTER: ICD-10-CM

## 2023-02-07 DIAGNOSIS — Z3A.21 21 WEEKS GESTATION OF PREGNANCY: ICD-10-CM

## 2023-02-07 PROCEDURE — 99999 PR PBB SHADOW E&M-EST. PATIENT-LVL II: ICD-10-PCS | Mod: PBBFAC,,, | Performed by: OBSTETRICS & GYNECOLOGY

## 2023-02-07 PROCEDURE — 0502F SUBSEQUENT PRENATAL CARE: CPT | Mod: S$GLB,,, | Performed by: OBSTETRICS & GYNECOLOGY

## 2023-02-07 PROCEDURE — 0502F PR SUBSEQUENT PRENATAL CARE: ICD-10-PCS | Mod: S$GLB,,, | Performed by: OBSTETRICS & GYNECOLOGY

## 2023-02-07 PROCEDURE — 99999 PR PBB SHADOW E&M-EST. PATIENT-LVL II: CPT | Mod: PBBFAC,,, | Performed by: OBSTETRICS & GYNECOLOGY

## 2023-02-07 NOTE — PROGRESS NOTES
Pt doing well, denies any c/o; discussed normal quad scr.    Assessment/Plan  1. Encounter for supervision of normal primigravida in second trimester, antepartum    2. 21 weeks gestation of pregnancy    3. Obesity complicating pregnancy, second trimester      20 minutes spend in total time reviewing labs, prior sonos and previous visits

## 2023-02-26 ENCOUNTER — OFFICE VISIT (OUTPATIENT)
Dept: URGENT CARE | Facility: CLINIC | Age: 23
End: 2023-02-26
Payer: COMMERCIAL

## 2023-02-26 ENCOUNTER — PATIENT MESSAGE (OUTPATIENT)
Dept: OTHER | Facility: OTHER | Age: 23
End: 2023-02-26
Payer: COMMERCIAL

## 2023-02-26 VITALS
SYSTOLIC BLOOD PRESSURE: 129 MMHG | BODY MASS INDEX: 36.99 KG/M2 | OXYGEN SATURATION: 98 % | HEART RATE: 99 BPM | RESPIRATION RATE: 18 BRPM | HEIGHT: 65 IN | DIASTOLIC BLOOD PRESSURE: 83 MMHG | WEIGHT: 222 LBS | TEMPERATURE: 99 F

## 2023-02-26 DIAGNOSIS — L02.416 ABSCESS OF LEFT THIGH: Primary | ICD-10-CM

## 2023-02-26 PROCEDURE — 1159F MED LIST DOCD IN RCRD: CPT | Mod: CPTII,S$GLB,,

## 2023-02-26 PROCEDURE — 1159F PR MEDICATION LIST DOCUMENTED IN MEDICAL RECORD: ICD-10-PCS | Mod: CPTII,S$GLB,,

## 2023-02-26 PROCEDURE — 99213 OFFICE O/P EST LOW 20 MIN: CPT | Mod: S$GLB,,,

## 2023-02-26 PROCEDURE — 1160F RVW MEDS BY RX/DR IN RCRD: CPT | Mod: CPTII,S$GLB,,

## 2023-02-26 PROCEDURE — 1160F PR REVIEW ALL MEDS BY PRESCRIBER/CLIN PHARMACIST DOCUMENTED: ICD-10-PCS | Mod: CPTII,S$GLB,,

## 2023-02-26 PROCEDURE — 3074F SYST BP LT 130 MM HG: CPT | Mod: CPTII,S$GLB,,

## 2023-02-26 PROCEDURE — 3079F DIAST BP 80-89 MM HG: CPT | Mod: CPTII,S$GLB,,

## 2023-02-26 PROCEDURE — 3079F PR MOST RECENT DIASTOLIC BLOOD PRESSURE 80-89 MM HG: ICD-10-PCS | Mod: CPTII,S$GLB,,

## 2023-02-26 PROCEDURE — 99213 PR OFFICE/OUTPT VISIT, EST, LEVL III, 20-29 MIN: ICD-10-PCS | Mod: S$GLB,,,

## 2023-02-26 PROCEDURE — 3074F PR MOST RECENT SYSTOLIC BLOOD PRESSURE < 130 MM HG: ICD-10-PCS | Mod: CPTII,S$GLB,,

## 2023-02-26 PROCEDURE — 3008F BODY MASS INDEX DOCD: CPT | Mod: CPTII,S$GLB,,

## 2023-02-26 PROCEDURE — 3008F PR BODY MASS INDEX (BMI) DOCUMENTED: ICD-10-PCS | Mod: CPTII,S$GLB,,

## 2023-02-26 RX ORDER — CLINDAMYCIN HYDROCHLORIDE 300 MG/1
300 CAPSULE ORAL EVERY 8 HOURS
Qty: 21 CAPSULE | Refills: 0 | Status: SHIPPED | OUTPATIENT
Start: 2023-02-26 | End: 2023-03-05

## 2023-02-26 NOTE — PATIENT INSTRUCTIONS
Soak affected area in warm water.  If you cannot soak  apply warm compresses to the affected area for 20 min, 3-5 times per day.  Otherwise, keep the wound clean and dry.  Take antibiotics to completion    Tylenol for pain    - YOU SHOULD EXPERIENCE SIGNIFICANT IMPROVEMENT IN SYMPTOMS IN THE NEXT 1-2 DAYS.  IF SYMPTOMS WORSEN, GO TO ER.  IF YOU DO NOT EXPERIENCE ANY IMPROVEMENT IN SYMPTOMS AND THE NEXT 1-2 DAYS WHILE YOU TAKE ANTIBIOTICS, RETURN TO CLINIC OR SEEK MEDICAL ATTENTION IMMEDIATELY.  - Follow up with your PCP or specialty clinic as directed in the next 1-2 weeks if not improved or as needed.  You can call [phone] to schedule an appointment with the appropriate provider.    - Go to the ER if you DEVELOP ANY NEW OR WORSENING SYMPTOMS.     - You must understand that you have received an Urgent Care treatment only and that you may be released before all of your medical problems are known or treated.   - You, the patient, will arrange for follow up care as instructed.   - If your condition worsens or fails to improve we recommend that you receive another evaluation at the ER immediately or contact your PCP to discuss your concerns or return here.

## 2023-02-26 NOTE — PROGRESS NOTES
"Subjective:       Patient ID: Renée Rhoades is a 22 y.o. female.    Vitals:  height is 5' 5" (1.651 m) and weight is 100.7 kg (222 lb). Her oral temperature is 99 °F (37.2 °C). Her blood pressure is 129/83 and her pulse is 99. Her respiration is 18 and oxygen saturation is 98%.     Chief Complaint: Abscess    Patient is a 22-year-old female who presents with a boil to her left inner thigh x5 days.  While she was in the lobby, the boil started draining on its own and pt then felt significant relief.  She denies any fever, chills, myalgias.  States that she is 6 months pregnant.    Abscess  Chronicity:  NewProgression Since Onset: not changed  Location:  Leg  Associated Symptoms: no fever, no chills, no sweats  Characteristics: draining, painful and redness    Treatments Tried:  Nothing  Relieved by:  Nothing  Worsened by:  Nothing  Constitution: Negative for chills and fever.   Cardiovascular:  Negative for chest pain.   Respiratory:  Negative for cough and shortness of breath.    Gastrointestinal:  Negative for abdominal pain, nausea and vomiting.   Musculoskeletal:  Negative for muscle ache.   Skin:  Positive for erythema, bruising and abscess. Negative for rash.   Neurological:  Negative for numbness and tingling.     Objective:      Physical Exam   Constitutional: She is oriented to person, place, and time. She appears well-developed.   HENT:   Head: Normocephalic and atraumatic. Head is without abrasion, without contusion and without laceration.   Ears:   Right Ear: External ear normal.   Left Ear: External ear normal.   Nose: Nose normal.   Mouth/Throat: Oropharynx is clear and moist and mucous membranes are normal.   Eyes: Conjunctivae, EOM and lids are normal. Pupils are equal, round, and reactive to light.   Neck: Trachea normal and phonation normal. Neck supple.   Cardiovascular: Normal rate, regular rhythm and normal heart sounds.   Pulmonary/Chest: Effort normal and breath sounds normal. No stridor. No " respiratory distress.   Musculoskeletal: Normal range of motion.         General: Normal range of motion.   Neurological: She is alert and oriented to person, place, and time.   Skin: Skin is warm, dry, intact and no rash. Capillary refill takes less than 2 seconds. erythema No abrasion, No burn, No bruising and No ecchymosis        Psychiatric: Her speech is normal and behavior is normal. Judgment and thought content normal.   Nursing note and vitals reviewed.      Assessment:       1. Abscess of left thigh          Plan:         Abscess of left thigh  -     clindamycin (CLEOCIN) 300 MG capsule; Take 1 capsule (300 mg total) by mouth every 8 (eight) hours. for 7 days  Dispense: 21 capsule; Refill: 0         Wound is already draining.  Incision and drainage not performed today.  Will place patient on antibiotics.  Discussed with patient to continue monitoring symptoms.  Clinic return and ED precautions given for worsening symptoms.          Patient Instructions   Soak affected area in warm water.  If you cannot soak  apply warm compresses to the affected area for 20 min, 3-5 times per day.  Otherwise, keep the wound clean and dry.  Take antibiotics to completion    Tylenol for pain    - YOU SHOULD EXPERIENCE SIGNIFICANT IMPROVEMENT IN SYMPTOMS IN THE NEXT 1-2 DAYS.  IF SYMPTOMS WORSEN, GO TO ER.  IF YOU DO NOT EXPERIENCE ANY IMPROVEMENT IN SYMPTOMS AND THE NEXT 1-2 DAYS WHILE YOU TAKE ANTIBIOTICS, RETURN TO CLINIC OR SEEK MEDICAL ATTENTION IMMEDIATELY.  - Follow up with your PCP or specialty clinic as directed in the next 1-2 weeks if not improved or as needed.  You can call [phone] to schedule an appointment with the appropriate provider.    - Go to the ER if you DEVELOP ANY NEW OR WORSENING SYMPTOMS.     - You must understand that you have received an Urgent Care treatment only and that you may be released before all of your medical problems are known or treated.   - You, the patient, will arrange for  follow up care as instructed.   - If your condition worsens or fails to improve we recommend that you receive another evaluation at the ER immediately or contact your PCP to discuss your concerns or return here.

## 2023-03-01 ENCOUNTER — PATIENT MESSAGE (OUTPATIENT)
Dept: MATERNAL FETAL MEDICINE | Facility: CLINIC | Age: 23
End: 2023-03-01
Payer: COMMERCIAL

## 2023-03-02 ENCOUNTER — PROCEDURE VISIT (OUTPATIENT)
Dept: MATERNAL FETAL MEDICINE | Facility: CLINIC | Age: 23
End: 2023-03-02
Payer: COMMERCIAL

## 2023-03-02 DIAGNOSIS — Z36.2 ENCOUNTER FOR FOLLOW-UP ULTRASOUND OF FETAL ANATOMY: ICD-10-CM

## 2023-03-02 PROCEDURE — 76816 US MFM PROCEDURE (VIEWPOINT): ICD-10-PCS | Mod: S$GLB,,, | Performed by: OBSTETRICS & GYNECOLOGY

## 2023-03-02 PROCEDURE — 76817 TRANSVAGINAL US OBSTETRIC: CPT | Mod: S$GLB,,, | Performed by: OBSTETRICS & GYNECOLOGY

## 2023-03-02 PROCEDURE — 76816 OB US FOLLOW-UP PER FETUS: CPT | Mod: S$GLB,,, | Performed by: OBSTETRICS & GYNECOLOGY

## 2023-03-02 PROCEDURE — 76817 US MFM PROCEDURE (VIEWPOINT): ICD-10-PCS | Mod: S$GLB,,, | Performed by: OBSTETRICS & GYNECOLOGY

## 2023-03-07 ENCOUNTER — LAB VISIT (OUTPATIENT)
Dept: LAB | Facility: HOSPITAL | Age: 23
End: 2023-03-07
Attending: OBSTETRICS & GYNECOLOGY
Payer: COMMERCIAL

## 2023-03-07 ENCOUNTER — ROUTINE PRENATAL (OUTPATIENT)
Dept: OBSTETRICS AND GYNECOLOGY | Facility: CLINIC | Age: 23
End: 2023-03-07
Payer: COMMERCIAL

## 2023-03-07 VITALS
DIASTOLIC BLOOD PRESSURE: 64 MMHG | BODY MASS INDEX: 38.12 KG/M2 | WEIGHT: 229.06 LBS | SYSTOLIC BLOOD PRESSURE: 112 MMHG

## 2023-03-07 DIAGNOSIS — Z3A.25 25 WEEKS GESTATION OF PREGNANCY: ICD-10-CM

## 2023-03-07 DIAGNOSIS — Z34.02 ENCOUNTER FOR SUPERVISION OF NORMAL PRIMIGRAVIDA IN SECOND TRIMESTER, ANTEPARTUM: Primary | ICD-10-CM

## 2023-03-07 DIAGNOSIS — Z34.02 ENCOUNTER FOR SUPERVISION OF NORMAL PRIMIGRAVIDA IN SECOND TRIMESTER, ANTEPARTUM: ICD-10-CM

## 2023-03-07 DIAGNOSIS — O99.212 OBESITY COMPLICATING PREGNANCY, SECOND TRIMESTER: ICD-10-CM

## 2023-03-07 LAB
ABO + RH BLD: NORMAL
BASOPHILS # BLD AUTO: 0.02 K/UL (ref 0–0.2)
BASOPHILS NFR BLD: 0.3 % (ref 0–1.9)
BLD GP AB SCN CELLS X3 SERPL QL: NORMAL
DIFFERENTIAL METHOD: ABNORMAL
EOSINOPHIL # BLD AUTO: 0.1 K/UL (ref 0–0.5)
EOSINOPHIL NFR BLD: 0.9 % (ref 0–8)
ERYTHROCYTE [DISTWIDTH] IN BLOOD BY AUTOMATED COUNT: 14.6 % (ref 11.5–14.5)
GLUCOSE SERPL-MCNC: 86 MG/DL (ref 70–140)
HCT VFR BLD AUTO: 31.4 % (ref 37–48.5)
HGB BLD-MCNC: 10.7 G/DL (ref 12–16)
IMM GRANULOCYTES # BLD AUTO: 0.03 K/UL (ref 0–0.04)
IMM GRANULOCYTES NFR BLD AUTO: 0.4 % (ref 0–0.5)
LYMPHOCYTES # BLD AUTO: 2.2 K/UL (ref 1–4.8)
LYMPHOCYTES NFR BLD: 31.3 % (ref 18–48)
MCH RBC QN AUTO: 26.8 PG (ref 27–31)
MCHC RBC AUTO-ENTMCNC: 34.1 G/DL (ref 32–36)
MCV RBC AUTO: 79 FL (ref 82–98)
MONOCYTES # BLD AUTO: 0.8 K/UL (ref 0.3–1)
MONOCYTES NFR BLD: 11.1 % (ref 4–15)
NEUTROPHILS # BLD AUTO: 3.9 K/UL (ref 1.8–7.7)
NEUTROPHILS NFR BLD: 56 % (ref 38–73)
NRBC BLD-RTO: 0 /100 WBC
PLATELET # BLD AUTO: 332 K/UL (ref 150–450)
PMV BLD AUTO: 9.6 FL (ref 9.2–12.9)
RBC # BLD AUTO: 3.99 M/UL (ref 4–5.4)
WBC # BLD AUTO: 6.87 K/UL (ref 3.9–12.7)

## 2023-03-07 PROCEDURE — 0502F PR SUBSEQUENT PRENATAL CARE: ICD-10-PCS | Mod: CPTII,S$GLB,, | Performed by: OBSTETRICS & GYNECOLOGY

## 2023-03-07 PROCEDURE — 0502F SUBSEQUENT PRENATAL CARE: CPT | Mod: CPTII,S$GLB,, | Performed by: OBSTETRICS & GYNECOLOGY

## 2023-03-07 PROCEDURE — 85025 COMPLETE CBC W/AUTO DIFF WBC: CPT | Performed by: OBSTETRICS & GYNECOLOGY

## 2023-03-07 PROCEDURE — 82950 GLUCOSE TEST: CPT | Performed by: OBSTETRICS & GYNECOLOGY

## 2023-03-07 PROCEDURE — 99999 PR PBB SHADOW E&M-EST. PATIENT-LVL II: ICD-10-PCS | Mod: PBBFAC,,, | Performed by: OBSTETRICS & GYNECOLOGY

## 2023-03-07 PROCEDURE — 99999 PR PBB SHADOW E&M-EST. PATIENT-LVL II: CPT | Mod: PBBFAC,,, | Performed by: OBSTETRICS & GYNECOLOGY

## 2023-03-07 PROCEDURE — 86900 BLOOD TYPING SEROLOGIC ABO: CPT | Performed by: OBSTETRICS & GYNECOLOGY

## 2023-03-07 PROCEDURE — 36415 COLL VENOUS BLD VENIPUNCTURE: CPT | Performed by: OBSTETRICS & GYNECOLOGY

## 2023-03-07 NOTE — PROGRESS NOTES
Pt doing well, denies any c/o currently; Pt to do PN II labs today    Assessment/Plan  1. Encounter for supervision of normal primigravida in second trimester, antepartum    2. 25 weeks gestation of pregnancy    3. Obesity complicating pregnancy, second trimester      20 minutes spend in total time reviewing labs, prior sonos and previous visits

## 2023-03-12 ENCOUNTER — PATIENT MESSAGE (OUTPATIENT)
Dept: OTHER | Facility: OTHER | Age: 23
End: 2023-03-12
Payer: COMMERCIAL

## 2023-03-26 ENCOUNTER — PATIENT MESSAGE (OUTPATIENT)
Dept: OTHER | Facility: OTHER | Age: 23
End: 2023-03-26
Payer: COMMERCIAL

## 2023-04-09 ENCOUNTER — PATIENT MESSAGE (OUTPATIENT)
Dept: OTHER | Facility: OTHER | Age: 23
End: 2023-04-09
Payer: COMMERCIAL

## 2023-04-23 ENCOUNTER — PATIENT MESSAGE (OUTPATIENT)
Dept: OTHER | Facility: OTHER | Age: 23
End: 2023-04-23
Payer: COMMERCIAL

## 2023-05-06 ENCOUNTER — OFFICE VISIT (OUTPATIENT)
Dept: URGENT CARE | Facility: CLINIC | Age: 23
End: 2023-05-06
Payer: COMMERCIAL

## 2023-05-06 VITALS
TEMPERATURE: 99 F | RESPIRATION RATE: 18 BRPM | DIASTOLIC BLOOD PRESSURE: 73 MMHG | SYSTOLIC BLOOD PRESSURE: 134 MMHG | BODY MASS INDEX: 38.11 KG/M2 | WEIGHT: 229 LBS | HEART RATE: 83 BPM | OXYGEN SATURATION: 99 %

## 2023-05-06 DIAGNOSIS — R39.9 UTI SYMPTOMS: ICD-10-CM

## 2023-05-06 DIAGNOSIS — N30.00 ACUTE CYSTITIS WITHOUT HEMATURIA: Primary | ICD-10-CM

## 2023-05-06 LAB
BILIRUB UR QL STRIP: NEGATIVE
GLUCOSE UR QL STRIP: NEGATIVE
KETONES UR QL STRIP: NEGATIVE
LEUKOCYTE ESTERASE UR QL STRIP: NEGATIVE
PH, POC UA: 6.5
POC BLOOD, URINE: NEGATIVE
POC NITRATES, URINE: NEGATIVE
PROT UR QL STRIP: NEGATIVE
SP GR UR STRIP: 1.01 (ref 1–1.03)
UROBILINOGEN UR STRIP-ACNC: NORMAL (ref 0.1–1.1)

## 2023-05-06 PROCEDURE — 81003 URINALYSIS AUTO W/O SCOPE: CPT | Mod: QW,S$GLB,, | Performed by: FAMILY MEDICINE

## 2023-05-06 PROCEDURE — 81003 POCT URINALYSIS, DIPSTICK, AUTOMATED, W/O SCOPE: ICD-10-PCS | Mod: QW,S$GLB,, | Performed by: FAMILY MEDICINE

## 2023-05-06 PROCEDURE — 87086 URINE CULTURE/COLONY COUNT: CPT | Performed by: FAMILY MEDICINE

## 2023-05-06 PROCEDURE — 99213 OFFICE O/P EST LOW 20 MIN: CPT | Mod: S$GLB,,, | Performed by: FAMILY MEDICINE

## 2023-05-06 PROCEDURE — 99213 PR OFFICE/OUTPT VISIT, EST, LEVL III, 20-29 MIN: ICD-10-PCS | Mod: S$GLB,,, | Performed by: FAMILY MEDICINE

## 2023-05-06 RX ORDER — NITROFURANTOIN 25; 75 MG/1; MG/1
100 CAPSULE ORAL 2 TIMES DAILY
Qty: 10 CAPSULE | Refills: 0 | Status: SHIPPED | OUTPATIENT
Start: 2023-05-06 | End: 2023-05-11

## 2023-05-06 NOTE — PATIENT INSTRUCTIONS
General Discharge Instructions   PLEASE READ YOUR DISCHARGE INSTRUCTIONS ENTIRELY AS IT CONTAINS IMPORTANT INFORMATION.  If you were prescribed a narcotic or controlled medication, do not drive or operate heavy equipment or machinery while taking these medications.  If you were prescribed antibiotics, please take them to completion.  You must understand that you've received an Urgent Care treatment only and that you may be released before all your medical problems are known or treated. You, the patient, will arrange for follow up care as instructed.    OVER THE COUNTER RECOMMENDATIONS/SUGGESTIONS.    Make sure to stay well hydrated.    Use Nasal Saline to mechanically move any post nasal drip from your eustachian tube or from the back of your throat.    Use warm salt water gargles to ease your throat pain. Warm salt water gargles as needed for sore throat- 1/2 tsp salt to 1 cup warm water, gargle as desired.    Use an antihistamine such as Claritin, Zyrtec or Allegra to dry you out.    Use pseudoephedrine (behind the counter) to decongest. Pseudoephedrine 30 mg up to 240 mg /day. It can raise your blood pressure and give you palpitations.    Use mucinex (guaifenesin) to break up mucous up to 2400mg/day to loosen any mucous.    The mucinex DM pill has a cough suppressant that can be sedating. It can be used at night to stop the tickle at the back of your throat.    You can use Mucinex D (it has guaifenesin and a high dose of pseudoephedrine) in the mornings to help decongest.    Use Afrin in each nare for no longer than 3 days, as it is addictive. It can also dry out your mucous membranes and cause elevated blood pressure. This is especially useful if you are flying.    Use Flonase 1-2 sprays/nostril per day. It is a local acting steroid nasal spray, if you develop a bloody nose, stop using the medication immediately.    Sometimes Nyquil at night is beneficial to help you get some rest, however it is sedating and it  does have an antihistamine, and tylenol.    Honey is a natural cough suppressant that can be used.    Tylenol up to 4,000 mg a day is safe for short periods and can be used for body aches, pain, and fever. However in high doses and prolonged use it can cause liver irritation.    Ibuprofen is a non-steroidal anti-inflammatory that can be used for body aches, pain, and fever.However it can also cause stomach irritation if over used.     Follow up with your PCP or specialty clinic as instructed in the next 2-3 days if not improved or as needed. You can call [phone] to schedule an appointment with appropriate provider.      If you condition worsens, we recommend that you receive another evaluation at the emergency room immediately or contact your primary medical clinic's after hours call service to discuss your concerns.      Please return here or go to the Emergency Department for any concerns or worsening condition.   You can also call [phone] to schedule an appointment with the appropriate provider.    Please return here or go to the Emergency Department for any concerns or worsening of condition.    Thank you for choosing [facility]!    Our goal in the Urgent Care is to always provide outstanding medical care. You may receive a survey by mail or e-mail in the next week regarding your experience today. We would greatly appreciate you completing and returning the survey. Your feedback provides us with a way to recognize our staff who provide very good care, and it helps us learn how to improve when your experience was below our aspiration of excellence.      We appreciate you trusting us with your medical care. We hope you feel better soon. We will be happy to take care of you for all of your future medical needs.    Sincerely,    SANJAY Portillo  PLEASE READ YOUR DISCHARGE INSTRUCTIONS ENTIRELY AS IT CONTAINS IMPORTANT INFORMATION.      Take the antibiotics to completion.     Drink plenty  of fluids, wipe front to back, take showers not baths, no scented soaps, wear breathable cotton underwear, urinate after sexual intercourse.     A urine culture was sent. You will be contacted once it results and appropriate action will be taken if needed.     Take the pyridium three times a day with meals. It will turn your urine orange. You do not need to take the whole prescription you can stop this once the pain is better and finish out the antibiotics    Please go to the ER for worsening symptoms including fever, worsening flank pain, vomiting, etc.       Please return or see your primary care doctor if you develop new or worsening symptoms.     Please arrange follow up with your primary medical clinic as soon as possible. You must understand that you've received an Urgent Care treatment only and that you may be released before all of your medical problems are known or treated. You, the patient, will arrange for follow up as instructed. If your symptoms worsen or fail to improve you should go to the Emergency Room.  WE CANNOT RULE OUT ALL POSSIBLE CAUSES OF YOUR SYMPTOMS IN THE URGENT CARE SETTING PLEASE GO TO THE ER IF YOU FEELS YOUR CONDITION IS WORSENING OR YOU WOULD LIKE EMERGENT EVALUATION.

## 2023-05-06 NOTE — PROGRESS NOTES
Subjective:      Patient ID: Renée Rhoades is a 23 y.o. female.    Vitals:  weight is 103.9 kg (229 lb). Her oral temperature is 98.8 °F (37.1 °C). Her blood pressure is 134/73 and her pulse is 83. Her respiration is 18 and oxygen saturation is 99%.     Chief Complaint: Urinary Tract Infection    Pt states that she is having burning , urgency and frequent urination that started last night . Pt is pregnant and has not taking any otc meds   Provider note begins below:  Pt denies any pmh presents with c/o dysuria, urg and freq that started yesterday. She has a fu with ob Monday, she is 8 mos preg, denies any vag dc, leakage of fluids, still with good fetal movement. Denies any fever or chills. She denies any constipation, lbm yesterday, denies any abd pain. She is compliant with her pnv. Planning for delivery at [facility], [place] 7[place].     Urinary Tract Infection   This is a new problem. The current episode started acute onset. The problem occurs every urination. The problem has been unchanged. The quality of the pain is described as burning. The pain is at a severity of 0/10. The patient is experiencing no pain. There has been no fever. The fever has been present for Less than 1 day. Associated symptoms include frequency and urgency. Pertinent negatives include no behavior changes, chills, discharge, flank pain, hematuria, hesitancy, nausea, possible pregnancy, sweats, vomiting, weight loss, bubble bath use, constipation, rash or withholding. She has tried nothing for the symptoms.     Constitution: Negative for activity change, appetite change, chills, sweating, fatigue, fever and unexpected weight change.   Gastrointestinal:  Negative for nausea, vomiting and constipation.   Genitourinary:  Positive for dysuria, frequency and urgency. Negative for urine decreased, flank pain, bladder incontinence, bed wetting, hematuria, history of kidney stones, genital trauma, vaginal pain, vaginal discharge, vaginal bleeding,  vaginal odor and pelvic pain.   Skin:  Negative for rash.    Objective:     Physical Exam   Constitutional: She is oriented to person, place, and time. She appears well-developed.  Non-toxic appearance. She does not appear ill. No distress.   HENT:   Head: Normocephalic and atraumatic.   Ears:   Right Ear: External ear normal.   Left Ear: External ear normal.   Nose: Nose normal.   Mouth/Throat: Oropharynx is clear and moist.   Eyes: Conjunctivae, EOM and lids are normal. Pupils are equal, round, and reactive to light.   Neck: Trachea normal and phonation normal. Neck supple.   Abdominal: There is no abdominal tenderness. There is no left CVA tenderness and no right CVA tenderness.      Comments: Gravid appearance.    Musculoskeletal: Normal range of motion.         General: Normal range of motion.   Neurological: She is alert and oriented to person, place, and time.   Skin: Skin is warm, dry, intact and not diaphoretic.   Psychiatric: Her speech is normal and behavior is normal. Judgment and thought content normal.   Nursing note and vitals reviewed.    Assessment:     1. Acute cystitis without hematuria    2. UTI symptoms      Results for orders placed or performed in visit on 05/06/23   POCT Urinalysis, Dipstick, Automated, W/O Scope   Result Value Ref Range    POC Blood, Urine Negative Negative    POC Bilirubin, Urine Negative Negative    POC Urobilinogen, Urine norm 0.1 - 1.1    POC Ketones, Urine Negative Negative    POC Protein, Urine Negative Negative    POC Nitrates, Urine Negative Negative    POC Glucose, Urine Negative Negative    pH, UA 6.5     POC Specific Gravity, Urine 1.015 1.003 - 1.029    POC Leukocytes, Urine Negative Negative      Plan:     Has ob appt Monday at 0800  Will get urine culture    Discussed results/diagnosis/plan with patient in clinic. Strict precautions given to patient to monitor for worsening signs and symptoms. Advised to follow up with PCP or specialist.    Explained side  effects of medications prescribed with patient and informed him/her to discontinue use if he/she has any side effects and to inform UC or PCP if this occurs. All questions answered. Strict ED verses clinic return precautions stressed and given in depth. Advised if symptoms worsens of fail to improve he/she should go to the Emergency Room. Discharge and follow-up instructions given verbally/printed with the patient who expressed understanding and willingness to comply with my recommendations. Patient voiced understanding and in agreement with current treatment plan. Patient exits the exam room in no acute distress. Conversant and engaged during discharge discussion, verbalized understanding.      Acute cystitis without hematuria  -     CULTURE, URINE  -     nitrofurantoin, macrocrystal-monohydrate, (MACROBID) 100 MG capsule; Take 1 capsule (100 mg total) by mouth 2 (two) times daily. for 5 days  Dispense: 10 capsule; Refill: 0    UTI symptoms  -     POCT Urinalysis, Dipstick, Automated, W/O Scope                Patient Instructions   General Discharge Instructions   PLEASE READ YOUR DISCHARGE INSTRUCTIONS ENTIRELY AS IT CONTAINS IMPORTANT INFORMATION.  If you were prescribed a narcotic or controlled medication, do not drive or operate heavy equipment or machinery while taking these medications.  If you were prescribed antibiotics, please take them to completion.  You must understand that you've received an Urgent Care treatment only and that you may be released before all your medical problems are known or treated. You, the patient, will arrange for follow up care as instructed.    OVER THE COUNTER RECOMMENDATIONS/SUGGESTIONS.    Make sure to stay well hydrated.    Use Nasal Saline to mechanically move any post nasal drip from your eustachian tube or from the back of your throat.    Use warm salt water gargles to ease your throat pain. Warm salt water gargles as needed for sore throat- 1/2 tsp salt to 1 cup warm  water, gargle as desired.    Use an antihistamine such as Claritin, Zyrtec or Allegra to dry you out.    Use pseudoephedrine (behind the counter) to decongest. Pseudoephedrine 30 mg up to 240 mg /day. It can raise your blood pressure and give you palpitations.    Use mucinex (guaifenesin) to break up mucous up to 2400mg/day to loosen any mucous.    The mucinex DM pill has a cough suppressant that can be sedating. It can be used at night to stop the tickle at the back of your throat.    You can use Mucinex D (it has guaifenesin and a high dose of pseudoephedrine) in the mornings to help decongest.    Use Afrin in each nare for no longer than 3 days, as it is addictive. It can also dry out your mucous membranes and cause elevated blood pressure. This is especially useful if you are flying.    Use Flonase 1-2 sprays/nostril per day. It is a local acting steroid nasal spray, if you develop a bloody nose, stop using the medication immediately.    Sometimes Nyquil at night is beneficial to help you get some rest, however it is sedating and it does have an antihistamine, and tylenol.    Honey is a natural cough suppressant that can be used.    Tylenol up to 4,000 mg a day is safe for short periods and can be used for body aches, pain, and fever. However in high doses and prolonged use it can cause liver irritation.    Ibuprofen is a non-steroidal anti-inflammatory that can be used for body aches, pain, and fever.However it can also cause stomach irritation if over used.     Follow up with your PCP or specialty clinic as instructed in the next 2-3 days if not improved or as needed. You can call [phone] to schedule an appointment with appropriate provider.      If you condition worsens, we recommend that you receive another evaluation at the emergency room immediately or contact your primary medical clinic's after hours call service to discuss your concerns.      Please return here or go to the Emergency Department  for any concerns or worsening condition.   You can also call [phone] to schedule an appointment with the appropriate provider.    Please return here or go to the Emergency Department for any concerns or worsening of condition.    Thank you for choosing [facility]!    Our goal in the Urgent Care is to always provide outstanding medical care. You may receive a survey by mail or e-mail in the next week regarding your experience today. We would greatly appreciate you completing and returning the survey. Your feedback provides us with a way to recognize our staff who provide very good care, and it helps us learn how to improve when your experience was below our aspiration of excellence.      We appreciate you trusting us with your medical care. We hope you feel better soon. We will be happy to take care of you for all of your future medical needs.    Sincerely,    SANJAY Portillo  PLEASE READ YOUR DISCHARGE INSTRUCTIONS ENTIRELY AS IT CONTAINS IMPORTANT INFORMATION.      Take the antibiotics to completion.     Drink plenty of fluids, wipe front to back, take showers not baths, no scented soaps, wear breathable cotton underwear, urinate after sexual intercourse.     A urine culture was sent. You will be contacted once it results and appropriate action will be taken if needed.     Take the pyridium three times a day with meals. It will turn your urine orange. You do not need to take the whole prescription you can stop this once the pain is better and finish out the antibiotics    Please go to the ER for worsening symptoms including fever, worsening flank pain, vomiting, etc.       Please return or see your primary care doctor if you develop new or worsening symptoms.     Please arrange follow up with your primary medical clinic as soon as possible. You must understand that you've received an Urgent Care treatment only and that you may be released before all of your medical problems are known or  treated. You, the patient, will arrange for follow up as instructed. If your symptoms worsen or fail to improve you should go to the Emergency Room.  WE CANNOT RULE OUT ALL POSSIBLE CAUSES OF YOUR SYMPTOMS IN THE URGENT CARE SETTING PLEASE GO TO THE ER IF YOU FEELS YOUR CONDITION IS WORSENING OR YOU WOULD LIKE EMERGENT EVALUATION.

## 2023-05-08 ENCOUNTER — ROUTINE PRENATAL (OUTPATIENT)
Dept: OBSTETRICS AND GYNECOLOGY | Facility: CLINIC | Age: 23
End: 2023-05-08
Payer: COMMERCIAL

## 2023-05-08 VITALS
BODY MASS INDEX: 40.92 KG/M2 | SYSTOLIC BLOOD PRESSURE: 114 MMHG | DIASTOLIC BLOOD PRESSURE: 70 MMHG | WEIGHT: 245.94 LBS

## 2023-05-08 DIAGNOSIS — Z34.03 ENCOUNTER FOR SUPERVISION OF NORMAL PRIMIGRAVIDA IN THIRD TRIMESTER, ANTEPARTUM: Primary | ICD-10-CM

## 2023-05-08 DIAGNOSIS — Z3A.34 34 WEEKS GESTATION OF PREGNANCY: ICD-10-CM

## 2023-05-08 LAB
BACTERIA UR CULT: NORMAL
BACTERIA UR CULT: NORMAL

## 2023-05-08 PROCEDURE — 0502F PR SUBSEQUENT PRENATAL CARE: ICD-10-PCS | Mod: S$GLB,,, | Performed by: OBSTETRICS & GYNECOLOGY

## 2023-05-08 PROCEDURE — 99999 PR PBB SHADOW E&M-EST. PATIENT-LVL II: CPT | Mod: PBBFAC,,, | Performed by: OBSTETRICS & GYNECOLOGY

## 2023-05-08 PROCEDURE — 0502F SUBSEQUENT PRENATAL CARE: CPT | Mod: S$GLB,,, | Performed by: OBSTETRICS & GYNECOLOGY

## 2023-05-08 PROCEDURE — 99999 PR PBB SHADOW E&M-EST. PATIENT-LVL II: ICD-10-PCS | Mod: PBBFAC,,, | Performed by: OBSTETRICS & GYNECOLOGY

## 2023-05-08 NOTE — PROGRESS NOTES
Pt doing well, denies any c/o; denies any ctx's as of yet.    Assessment/Plan  1. Encounter for supervision of normal primigravida in third trimester, antepartum    2. 34 weeks gestation of pregnancy      20 minutes spend in total time reviewing labs, prior sonos and previous visits

## 2023-05-10 ENCOUNTER — TELEPHONE (OUTPATIENT)
Dept: URGENT CARE | Facility: CLINIC | Age: 23
End: 2023-05-10
Payer: COMMERCIAL

## 2023-05-11 ENCOUNTER — TELEPHONE (OUTPATIENT)
Dept: URGENT CARE | Facility: CLINIC | Age: 23
End: 2023-05-11
Payer: COMMERCIAL

## 2023-05-11 NOTE — TELEPHONE ENCOUNTER
Results for orders placed or performed in visit on 05/06/23   CULTURE, URINE    Specimen: Urine, Clean Catch   Result Value Ref Range    Urine Culture, Routine       Multiple organisms isolated. None in predominance.  Repeat if    Urine Culture, Routine clinically necessary.    POCT Urinalysis, Dipstick, Automated, W/O Scope   Result Value Ref Range    POC Blood, Urine Negative Negative    POC Bilirubin, Urine Negative Negative    POC Urobilinogen, Urine norm 0.1 - 1.1    POC Ketones, Urine Negative Negative    POC Protein, Urine Negative Negative    POC Nitrates, Urine Negative Negative    POC Glucose, Urine Negative Negative    pH, UA 6.5     POC Specific Gravity, Urine 1.015 1.003 - 1.029    POC Leukocytes, Urine Negative Negative      Called and discussed test results with patient, she verified full name and date of birth.  She did follow-up with OB and was advised to continue antibiotics.  Discussed urine culture results with her, she reports symptoms resolved no longer having any complaints or concerns.  Complete antibiotics and follow-up with OB.  She verbalized understanding, advised to follow-up with any future questions or concerns she agreed with plan.

## 2023-05-14 ENCOUNTER — PATIENT MESSAGE (OUTPATIENT)
Dept: OTHER | Facility: OTHER | Age: 23
End: 2023-05-14
Payer: COMMERCIAL

## 2023-05-23 ENCOUNTER — ROUTINE PRENATAL (OUTPATIENT)
Dept: OBSTETRICS AND GYNECOLOGY | Facility: CLINIC | Age: 23
End: 2023-05-23
Payer: COMMERCIAL

## 2023-05-23 VITALS
BODY MASS INDEX: 41.46 KG/M2 | DIASTOLIC BLOOD PRESSURE: 74 MMHG | SYSTOLIC BLOOD PRESSURE: 114 MMHG | WEIGHT: 249.13 LBS

## 2023-05-23 DIAGNOSIS — O99.213 OBESITY COMPLICATING PREGNANCY, THIRD TRIMESTER: ICD-10-CM

## 2023-05-23 DIAGNOSIS — Z3A.36 36 WEEKS GESTATION OF PREGNANCY: ICD-10-CM

## 2023-05-23 DIAGNOSIS — Z34.03 ENCOUNTER FOR SUPERVISION OF NORMAL PRIMIGRAVIDA IN THIRD TRIMESTER, ANTEPARTUM: Primary | ICD-10-CM

## 2023-05-23 PROCEDURE — 87081 CULTURE SCREEN ONLY: CPT | Performed by: OBSTETRICS & GYNECOLOGY

## 2023-05-23 PROCEDURE — 99999 PR PBB SHADOW E&M-EST. PATIENT-LVL II: ICD-10-PCS | Mod: PBBFAC,,, | Performed by: OBSTETRICS & GYNECOLOGY

## 2023-05-23 PROCEDURE — 0502F PR SUBSEQUENT PRENATAL CARE: ICD-10-PCS | Mod: S$GLB,,, | Performed by: OBSTETRICS & GYNECOLOGY

## 2023-05-23 PROCEDURE — 99999 PR PBB SHADOW E&M-EST. PATIENT-LVL II: CPT | Mod: PBBFAC,,, | Performed by: OBSTETRICS & GYNECOLOGY

## 2023-05-23 PROCEDURE — 0502F SUBSEQUENT PRENATAL CARE: CPT | Mod: S$GLB,,, | Performed by: OBSTETRICS & GYNECOLOGY

## 2023-05-23 NOTE — PROGRESS NOTES
Pt denies any c/o; delivery consents given; GBS collected; labor precautions    Assessment/Plan  1. Encounter for supervision of normal primigravida in third trimester, antepartum    2. 36 weeks gestation of pregnancy    3. Obesity complicating pregnancy, third trimester      20 minutes spend in total time reviewing labs, prior sonos and previous visits

## 2023-05-25 LAB — BACTERIA SPEC AEROBE CULT: NORMAL

## 2023-05-29 ENCOUNTER — ROUTINE PRENATAL (OUTPATIENT)
Dept: OBSTETRICS AND GYNECOLOGY | Facility: CLINIC | Age: 23
End: 2023-05-29
Payer: COMMERCIAL

## 2023-05-29 VITALS
WEIGHT: 251.31 LBS | SYSTOLIC BLOOD PRESSURE: 116 MMHG | BODY MASS INDEX: 41.82 KG/M2 | DIASTOLIC BLOOD PRESSURE: 72 MMHG

## 2023-05-29 DIAGNOSIS — Z3A.37 PREGNANCY WITH 37 WEEKS COMPLETED GESTATION: ICD-10-CM

## 2023-05-29 DIAGNOSIS — Z34.03 ENCOUNTER FOR SUPERVISION OF NORMAL PRIMIGRAVIDA IN THIRD TRIMESTER, ANTEPARTUM: Primary | ICD-10-CM

## 2023-05-29 PROCEDURE — 0502F PR SUBSEQUENT PRENATAL CARE: ICD-10-PCS | Mod: S$GLB,,, | Performed by: OBSTETRICS & GYNECOLOGY

## 2023-05-29 PROCEDURE — 99999 PR PBB SHADOW E&M-EST. PATIENT-LVL II: ICD-10-PCS | Mod: PBBFAC,,, | Performed by: OBSTETRICS & GYNECOLOGY

## 2023-05-29 PROCEDURE — 99999 PR PBB SHADOW E&M-EST. PATIENT-LVL II: CPT | Mod: PBBFAC,,, | Performed by: OBSTETRICS & GYNECOLOGY

## 2023-05-29 PROCEDURE — 0502F SUBSEQUENT PRENATAL CARE: CPT | Mod: S$GLB,,, | Performed by: OBSTETRICS & GYNECOLOGY

## 2023-05-29 NOTE — PROGRESS NOTES
Pt admits increased pelvic pressure; good FM, delivery consents signed; labor precautions    Assessment/Plan  1. Encounter for supervision of normal primigravida in third trimester, antepartum    2. Pregnancy with 37 weeks completed gestation      20 minutes spend in total time reviewing labs, prior sonos and previous visits     50

## 2023-06-05 ENCOUNTER — ROUTINE PRENATAL (OUTPATIENT)
Dept: OBSTETRICS AND GYNECOLOGY | Facility: CLINIC | Age: 23
End: 2023-06-05
Payer: COMMERCIAL

## 2023-06-05 VITALS
BODY MASS INDEX: 42.46 KG/M2 | DIASTOLIC BLOOD PRESSURE: 82 MMHG | SYSTOLIC BLOOD PRESSURE: 130 MMHG | WEIGHT: 255.19 LBS

## 2023-06-05 DIAGNOSIS — Z34.03 ENCOUNTER FOR SUPERVISION OF NORMAL PRIMIGRAVIDA IN THIRD TRIMESTER, ANTEPARTUM: Primary | ICD-10-CM

## 2023-06-05 DIAGNOSIS — O99.213 OBESITY COMPLICATING PREGNANCY, THIRD TRIMESTER: ICD-10-CM

## 2023-06-05 DIAGNOSIS — Z3A.38 38 WEEKS GESTATION OF PREGNANCY: ICD-10-CM

## 2023-06-05 PROCEDURE — 0502F SUBSEQUENT PRENATAL CARE: CPT | Mod: S$GLB,,, | Performed by: OBSTETRICS & GYNECOLOGY

## 2023-06-05 PROCEDURE — 0502F PR SUBSEQUENT PRENATAL CARE: ICD-10-PCS | Mod: S$GLB,,, | Performed by: OBSTETRICS & GYNECOLOGY

## 2023-06-05 PROCEDURE — 99999 PR PBB SHADOW E&M-EST. PATIENT-LVL II: ICD-10-PCS | Mod: PBBFAC,,, | Performed by: OBSTETRICS & GYNECOLOGY

## 2023-06-05 PROCEDURE — 99999 PR PBB SHADOW E&M-EST. PATIENT-LVL II: CPT | Mod: PBBFAC,,, | Performed by: OBSTETRICS & GYNECOLOGY

## 2023-06-05 NOTE — PROGRESS NOTES
Discussed pepcid OTC for reflux; labor precautions; pt states she feels increased pressure from prior exam    Assessment/Plan  1. Encounter for supervision of normal primigravida in third trimester, antepartum    2. 38 weeks gestation of pregnancy    3. Obesity complicating pregnancy, third trimester      20 minutes spend in total time reviewing labs, prior sonos and previous visits

## 2023-06-12 ENCOUNTER — ROUTINE PRENATAL (OUTPATIENT)
Dept: OBSTETRICS AND GYNECOLOGY | Facility: CLINIC | Age: 23
End: 2023-06-12
Payer: COMMERCIAL

## 2023-06-12 VITALS
SYSTOLIC BLOOD PRESSURE: 120 MMHG | DIASTOLIC BLOOD PRESSURE: 80 MMHG | BODY MASS INDEX: 38.15 KG/M2 | WEIGHT: 229.25 LBS

## 2023-06-12 DIAGNOSIS — N76.4 VULVAR ABSCESS: ICD-10-CM

## 2023-06-12 DIAGNOSIS — Z34.03 ENCOUNTER FOR SUPERVISION OF NORMAL PRIMIGRAVIDA IN THIRD TRIMESTER, ANTEPARTUM: Primary | ICD-10-CM

## 2023-06-12 DIAGNOSIS — Z3A.39 39 WEEKS GESTATION OF PREGNANCY: ICD-10-CM

## 2023-06-12 PROCEDURE — 99999 PR PBB SHADOW E&M-EST. PATIENT-LVL II: ICD-10-PCS | Mod: PBBFAC,,, | Performed by: OBSTETRICS & GYNECOLOGY

## 2023-06-12 PROCEDURE — 0502F PR SUBSEQUENT PRENATAL CARE: ICD-10-PCS | Mod: S$GLB,,, | Performed by: OBSTETRICS & GYNECOLOGY

## 2023-06-12 PROCEDURE — 56405 I&D VULVA/PERINEAL ABSCESS: CPT | Mod: S$GLB,,, | Performed by: OBSTETRICS & GYNECOLOGY

## 2023-06-12 PROCEDURE — 99999 PR PBB SHADOW E&M-EST. PATIENT-LVL II: CPT | Mod: PBBFAC,,, | Performed by: OBSTETRICS & GYNECOLOGY

## 2023-06-12 PROCEDURE — 0502F SUBSEQUENT PRENATAL CARE: CPT | Mod: S$GLB,,, | Performed by: OBSTETRICS & GYNECOLOGY

## 2023-06-12 PROCEDURE — 56405 PR I&D OF VULVA/PERINEUM ABSCESS: ICD-10-PCS | Mod: S$GLB,,, | Performed by: OBSTETRICS & GYNECOLOGY

## 2023-06-12 RX ORDER — CLINDAMYCIN HYDROCHLORIDE 300 MG/1
300 CAPSULE ORAL EVERY 6 HOURS
Status: ON HOLD | COMMUNITY
End: 2023-06-23 | Stop reason: HOSPADM

## 2023-06-12 NOTE — PROGRESS NOTES
Discussed with pt she may go beyond her due date since no evidence of labor at this time.  On vag exam, pt noted to have a vulvar abscess on the posterior aspect of the L labia majora. Pt was verbally consented for I&D.   Abscess was cleaned with betadine and incised with a #11 scalpel.  Copious amount of pus was extracted.  Wound left open to drain.  Pt tolerated well.    Assessment/Plan  1. Encounter for supervision of normal primigravida in third trimester, antepartum    2. 39 weeks gestation of pregnancy    3. Vulvar abscess      30 minutes spend in total time reviewing labs, prior sonos and previous visits

## 2023-06-19 ENCOUNTER — PATIENT MESSAGE (OUTPATIENT)
Dept: OBSTETRICS AND GYNECOLOGY | Facility: CLINIC | Age: 23
End: 2023-06-19

## 2023-06-19 ENCOUNTER — ROUTINE PRENATAL (OUTPATIENT)
Dept: OBSTETRICS AND GYNECOLOGY | Facility: CLINIC | Age: 23
End: 2023-06-19
Payer: COMMERCIAL

## 2023-06-19 ENCOUNTER — HOSPITAL ENCOUNTER (INPATIENT)
Facility: HOSPITAL | Age: 23
LOS: 4 days | Discharge: HOME | End: 2023-06-23
Attending: OBSTETRICS & GYNECOLOGY | Admitting: OBSTETRICS & GYNECOLOGY
Payer: COMMERCIAL

## 2023-06-19 VITALS
BODY MASS INDEX: 43.46 KG/M2 | SYSTOLIC BLOOD PRESSURE: 152 MMHG | WEIGHT: 261.13 LBS | DIASTOLIC BLOOD PRESSURE: 98 MMHG

## 2023-06-19 DIAGNOSIS — Z3A.40 40 WEEKS GESTATION OF PREGNANCY: Primary | ICD-10-CM

## 2023-06-19 DIAGNOSIS — O13.3 GESTATIONAL HYPERTENSION, THIRD TRIMESTER: ICD-10-CM

## 2023-06-19 DIAGNOSIS — Z3A.40 40 WEEKS GESTATION OF PREGNANCY: ICD-10-CM

## 2023-06-19 DIAGNOSIS — Z98.891 S/P CESAREAN SECTION: Primary | ICD-10-CM

## 2023-06-19 DIAGNOSIS — O99.213 OBESITY COMPLICATING PREGNANCY, THIRD TRIMESTER: ICD-10-CM

## 2023-06-19 LAB
ABO + RH BLD: NORMAL
ALBUMIN SERPL BCP-MCNC: 3 G/DL (ref 3.5–5.2)
ALP SERPL-CCNC: 101 U/L (ref 55–135)
ALT SERPL W/O P-5'-P-CCNC: 14 U/L (ref 10–44)
ANION GAP SERPL CALC-SCNC: 8 MMOL/L (ref 8–16)
AST SERPL-CCNC: 16 U/L (ref 10–40)
BASOPHILS # BLD AUTO: 0.02 K/UL (ref 0–0.2)
BASOPHILS NFR BLD: 0.2 % (ref 0–1.9)
BILIRUB SERPL-MCNC: 0.4 MG/DL (ref 0.1–1)
BLD GP AB SCN CELLS X3 SERPL QL: NORMAL
BUN SERPL-MCNC: 6 MG/DL (ref 6–20)
CALCIUM SERPL-MCNC: 9.1 MG/DL (ref 8.7–10.5)
CHLORIDE SERPL-SCNC: 108 MMOL/L (ref 95–110)
CO2 SERPL-SCNC: 20 MMOL/L (ref 23–29)
CREAT SERPL-MCNC: 0.7 MG/DL (ref 0.5–1.4)
CREAT UR-MCNC: 63.6 MG/DL (ref 15–325)
DIFFERENTIAL METHOD: ABNORMAL
EOSINOPHIL # BLD AUTO: 0.1 K/UL (ref 0–0.5)
EOSINOPHIL NFR BLD: 0.5 % (ref 0–8)
ERYTHROCYTE [DISTWIDTH] IN BLOOD BY AUTOMATED COUNT: 16.5 % (ref 11.5–14.5)
EST. GFR  (NO RACE VARIABLE): >60 ML/MIN/1.73 M^2
GLUCOSE SERPL-MCNC: 73 MG/DL (ref 70–110)
HCT VFR BLD AUTO: 34.6 % (ref 37–48.5)
HGB BLD-MCNC: 11.5 G/DL (ref 12–16)
IMM GRANULOCYTES # BLD AUTO: 0.07 K/UL (ref 0–0.04)
IMM GRANULOCYTES NFR BLD AUTO: 0.8 % (ref 0–0.5)
LDH SERPL L TO P-CCNC: 201 U/L (ref 110–260)
LYMPHOCYTES # BLD AUTO: 2.7 K/UL (ref 1–4.8)
LYMPHOCYTES NFR BLD: 29.4 % (ref 18–48)
MCH RBC QN AUTO: 25.3 PG (ref 27–31)
MCHC RBC AUTO-ENTMCNC: 33.2 G/DL (ref 32–36)
MCV RBC AUTO: 76 FL (ref 82–98)
MONOCYTES # BLD AUTO: 1 K/UL (ref 0.3–1)
MONOCYTES NFR BLD: 10.7 % (ref 4–15)
NEUTROPHILS # BLD AUTO: 5.5 K/UL (ref 1.8–7.7)
NEUTROPHILS NFR BLD: 58.4 % (ref 38–73)
NRBC BLD-RTO: 0 /100 WBC
PLATELET # BLD AUTO: 301 K/UL (ref 150–450)
PMV BLD AUTO: 10 FL (ref 9.2–12.9)
POTASSIUM SERPL-SCNC: 3.9 MMOL/L (ref 3.5–5.1)
PROT SERPL-MCNC: 6.9 G/DL (ref 6–8.4)
PROT UR-MCNC: <7 MG/DL
PROT/CREAT UR: NORMAL MG/G{CREAT} (ref 0–0.2)
RBC # BLD AUTO: 4.54 M/UL (ref 4–5.4)
SODIUM SERPL-SCNC: 136 MMOL/L (ref 136–145)
URATE SERPL-MCNC: 3 MG/DL (ref 2.4–5.7)
WBC # BLD AUTO: 9.33 K/UL (ref 3.9–12.7)

## 2023-06-19 PROCEDURE — 63600175 PHARM REV CODE 636 W HCPCS: Performed by: OBSTETRICS & GYNECOLOGY

## 2023-06-19 PROCEDURE — 80053 COMPREHEN METABOLIC PANEL: CPT | Performed by: OBSTETRICS & GYNECOLOGY

## 2023-06-19 PROCEDURE — 72100002 HC LABOR CARE, 1ST 8 HOURS

## 2023-06-19 PROCEDURE — 0502F SUBSEQUENT PRENATAL CARE: CPT | Mod: CPTII,S$GLB,, | Performed by: OBSTETRICS & GYNECOLOGY

## 2023-06-19 PROCEDURE — 59025 FETAL NON-STRESS TEST: CPT

## 2023-06-19 PROCEDURE — 85025 COMPLETE CBC W/AUTO DIFF WBC: CPT | Performed by: OBSTETRICS & GYNECOLOGY

## 2023-06-19 PROCEDURE — 0502F PR SUBSEQUENT PRENATAL CARE: ICD-10-PCS | Mod: CPTII,S$GLB,, | Performed by: OBSTETRICS & GYNECOLOGY

## 2023-06-19 PROCEDURE — 99999 PR PBB SHADOW E&M-EST. PATIENT-LVL III: CPT | Mod: PBBFAC,,, | Performed by: OBSTETRICS & GYNECOLOGY

## 2023-06-19 PROCEDURE — 25000003 PHARM REV CODE 250: Performed by: OBSTETRICS & GYNECOLOGY

## 2023-06-19 PROCEDURE — 84156 ASSAY OF PROTEIN URINE: CPT | Performed by: OBSTETRICS & GYNECOLOGY

## 2023-06-19 PROCEDURE — 99999 PR PBB SHADOW E&M-EST. PATIENT-LVL III: ICD-10-PCS | Mod: PBBFAC,,, | Performed by: OBSTETRICS & GYNECOLOGY

## 2023-06-19 PROCEDURE — 83615 LACTATE (LD) (LDH) ENZYME: CPT | Performed by: OBSTETRICS & GYNECOLOGY

## 2023-06-19 PROCEDURE — 86592 SYPHILIS TEST NON-TREP QUAL: CPT | Performed by: OBSTETRICS & GYNECOLOGY

## 2023-06-19 PROCEDURE — 84550 ASSAY OF BLOOD/URIC ACID: CPT | Performed by: OBSTETRICS & GYNECOLOGY

## 2023-06-19 PROCEDURE — 86900 BLOOD TYPING SEROLOGIC ABO: CPT | Performed by: OBSTETRICS & GYNECOLOGY

## 2023-06-19 PROCEDURE — 11000001 HC ACUTE MED/SURG PRIVATE ROOM

## 2023-06-19 RX ORDER — TERBUTALINE SULFATE 1 MG/ML
0.25 INJECTION SUBCUTANEOUS
Status: DISCONTINUED | OUTPATIENT
Start: 2023-06-19 | End: 2023-06-23 | Stop reason: HOSPADM

## 2023-06-19 RX ORDER — MUPIROCIN 20 MG/G
OINTMENT TOPICAL
Status: CANCELLED | OUTPATIENT
Start: 2023-06-19

## 2023-06-19 RX ORDER — MISOPROSTOL 100 MCG
50 TABLET ORAL EVERY 4 HOURS PRN
Status: COMPLETED | OUTPATIENT
Start: 2023-06-19 | End: 2023-06-20

## 2023-06-19 RX ORDER — BUTORPHANOL TARTRATE 1 MG/ML
1 INJECTION INTRAMUSCULAR; INTRAVENOUS
Status: DISCONTINUED | OUTPATIENT
Start: 2023-06-19 | End: 2023-06-19

## 2023-06-19 RX ORDER — METHYLERGONOVINE MALEATE 0.2 MG/ML
200 INJECTION INTRAVENOUS
Status: DISCONTINUED | OUTPATIENT
Start: 2023-06-19 | End: 2023-06-23 | Stop reason: HOSPADM

## 2023-06-19 RX ORDER — DIPHENOXYLATE HYDROCHLORIDE AND ATROPINE SULFATE 2.5; .025 MG/1; MG/1
1 TABLET ORAL 4 TIMES DAILY PRN
Status: DISCONTINUED | OUTPATIENT
Start: 2023-06-19 | End: 2023-06-23 | Stop reason: HOSPADM

## 2023-06-19 RX ORDER — OXYTOCIN/RINGER'S LACTATE 30/500 ML
334 PLASTIC BAG, INJECTION (ML) INTRAVENOUS ONCE
Status: DISCONTINUED | OUTPATIENT
Start: 2023-06-19 | End: 2023-06-21

## 2023-06-19 RX ORDER — MISOPROSTOL 100 UG/1
800 TABLET ORAL
Status: CANCELLED | OUTPATIENT
Start: 2023-06-19

## 2023-06-19 RX ORDER — DIPHENOXYLATE HYDROCHLORIDE AND ATROPINE SULFATE 2.5; .025 MG/1; MG/1
1 TABLET ORAL 4 TIMES DAILY PRN
Status: CANCELLED | OUTPATIENT
Start: 2023-06-19

## 2023-06-19 RX ORDER — MUPIROCIN 20 MG/G
OINTMENT TOPICAL
Status: DISCONTINUED | OUTPATIENT
Start: 2023-06-19 | End: 2023-06-21

## 2023-06-19 RX ORDER — LIDOCAINE HYDROCHLORIDE 10 MG/ML
10 INJECTION INFILTRATION; PERINEURAL ONCE AS NEEDED
Status: CANCELLED | OUTPATIENT
Start: 2023-06-19 | End: 2034-11-15

## 2023-06-19 RX ORDER — METHYLERGONOVINE MALEATE 0.2 MG/ML
200 INJECTION INTRAVENOUS
Status: CANCELLED | OUTPATIENT
Start: 2023-06-19

## 2023-06-19 RX ORDER — OXYTOCIN/RINGER'S LACTATE 30/500 ML
334 PLASTIC BAG, INJECTION (ML) INTRAVENOUS ONCE
Status: CANCELLED | OUTPATIENT
Start: 2023-06-19 | End: 2023-06-19

## 2023-06-19 RX ORDER — PROCHLORPERAZINE EDISYLATE 5 MG/ML
5 INJECTION INTRAMUSCULAR; INTRAVENOUS EVERY 6 HOURS PRN
Status: DISCONTINUED | OUTPATIENT
Start: 2023-06-19 | End: 2023-06-23 | Stop reason: HOSPADM

## 2023-06-19 RX ORDER — SODIUM CHLORIDE, SODIUM LACTATE, POTASSIUM CHLORIDE, CALCIUM CHLORIDE 600; 310; 30; 20 MG/100ML; MG/100ML; MG/100ML; MG/100ML
INJECTION, SOLUTION INTRAVENOUS CONTINUOUS
Status: CANCELLED | OUTPATIENT
Start: 2023-06-19

## 2023-06-19 RX ORDER — OXYTOCIN/RINGER'S LACTATE 30/500 ML
95 PLASTIC BAG, INJECTION (ML) INTRAVENOUS ONCE
Status: DISCONTINUED | OUTPATIENT
Start: 2023-06-19 | End: 2023-06-21

## 2023-06-19 RX ORDER — CALCIUM CARBONATE 200(500)MG
500 TABLET,CHEWABLE ORAL 3 TIMES DAILY PRN
Status: CANCELLED | OUTPATIENT
Start: 2023-06-19

## 2023-06-19 RX ORDER — MISOPROSTOL 200 UG/1
800 TABLET ORAL
Status: DISCONTINUED | OUTPATIENT
Start: 2023-06-19 | End: 2023-06-23 | Stop reason: HOSPADM

## 2023-06-19 RX ORDER — BUTORPHANOL TARTRATE 1 MG/ML
1 INJECTION INTRAMUSCULAR; INTRAVENOUS
Status: CANCELLED | OUTPATIENT
Start: 2023-06-19

## 2023-06-19 RX ORDER — ONDANSETRON 4 MG/1
8 TABLET, ORALLY DISINTEGRATING ORAL EVERY 8 HOURS PRN
Status: CANCELLED | OUTPATIENT
Start: 2023-06-19

## 2023-06-19 RX ORDER — OXYTOCIN 10 [USP'U]/ML
10 INJECTION, SOLUTION INTRAMUSCULAR; INTRAVENOUS ONCE AS NEEDED
Status: DISCONTINUED | OUTPATIENT
Start: 2023-06-19 | End: 2023-06-23 | Stop reason: HOSPADM

## 2023-06-19 RX ORDER — CALCIUM CARBONATE 200(500)MG
500 TABLET,CHEWABLE ORAL 3 TIMES DAILY PRN
Status: DISCONTINUED | OUTPATIENT
Start: 2023-06-19 | End: 2023-06-23 | Stop reason: HOSPADM

## 2023-06-19 RX ORDER — LIDOCAINE HYDROCHLORIDE 10 MG/ML
10 INJECTION INFILTRATION; PERINEURAL ONCE AS NEEDED
Status: DISCONTINUED | OUTPATIENT
Start: 2023-06-19 | End: 2023-06-23 | Stop reason: HOSPADM

## 2023-06-19 RX ORDER — TERBUTALINE SULFATE 1 MG/ML
0.25 INJECTION SUBCUTANEOUS
Status: CANCELLED | OUTPATIENT
Start: 2023-06-19

## 2023-06-19 RX ORDER — OXYTOCIN/RINGER'S LACTATE 30/500 ML
334 PLASTIC BAG, INJECTION (ML) INTRAVENOUS ONCE AS NEEDED
Status: CANCELLED | OUTPATIENT
Start: 2023-06-19 | End: 2034-11-15

## 2023-06-19 RX ORDER — PROCHLORPERAZINE EDISYLATE 5 MG/ML
5 INJECTION INTRAMUSCULAR; INTRAVENOUS EVERY 6 HOURS PRN
Status: CANCELLED | OUTPATIENT
Start: 2023-06-19

## 2023-06-19 RX ORDER — CARBOPROST TROMETHAMINE 250 UG/ML
250 INJECTION, SOLUTION INTRAMUSCULAR
Status: DISCONTINUED | OUTPATIENT
Start: 2023-06-19 | End: 2023-06-23 | Stop reason: HOSPADM

## 2023-06-19 RX ORDER — MISOPROSTOL 100 UG/1
800 TABLET ORAL ONCE AS NEEDED
Status: CANCELLED | OUTPATIENT
Start: 2023-06-19 | End: 2034-11-15

## 2023-06-19 RX ORDER — MISOPROSTOL 200 UG/1
800 TABLET ORAL ONCE AS NEEDED
Status: DISCONTINUED | OUTPATIENT
Start: 2023-06-19 | End: 2023-06-23 | Stop reason: HOSPADM

## 2023-06-19 RX ORDER — TRANEXAMIC ACID 10 MG/ML
1000 INJECTION, SOLUTION INTRAVENOUS ONCE AS NEEDED
Status: DISCONTINUED | OUTPATIENT
Start: 2023-06-19 | End: 2023-06-23 | Stop reason: HOSPADM

## 2023-06-19 RX ORDER — OXYTOCIN/RINGER'S LACTATE 30/500 ML
95 PLASTIC BAG, INJECTION (ML) INTRAVENOUS ONCE
Status: CANCELLED | OUTPATIENT
Start: 2023-06-19 | End: 2023-06-19

## 2023-06-19 RX ORDER — OXYTOCIN/RINGER'S LACTATE 30/500 ML
334 PLASTIC BAG, INJECTION (ML) INTRAVENOUS ONCE AS NEEDED
Status: DISCONTINUED | OUTPATIENT
Start: 2023-06-19 | End: 2023-06-23 | Stop reason: HOSPADM

## 2023-06-19 RX ORDER — CARBOPROST TROMETHAMINE 250 UG/ML
250 INJECTION, SOLUTION INTRAMUSCULAR
Status: CANCELLED | OUTPATIENT
Start: 2023-06-19

## 2023-06-19 RX ORDER — OXYTOCIN 10 [USP'U]/ML
10 INJECTION, SOLUTION INTRAMUSCULAR; INTRAVENOUS ONCE AS NEEDED
Status: CANCELLED | OUTPATIENT
Start: 2023-06-19 | End: 2034-11-15

## 2023-06-19 RX ORDER — ONDANSETRON 8 MG/1
8 TABLET, ORALLY DISINTEGRATING ORAL EVERY 8 HOURS PRN
Status: DISCONTINUED | OUTPATIENT
Start: 2023-06-19 | End: 2023-06-23 | Stop reason: HOSPADM

## 2023-06-19 RX ORDER — SIMETHICONE 80 MG
1 TABLET,CHEWABLE ORAL 4 TIMES DAILY PRN
Status: DISCONTINUED | OUTPATIENT
Start: 2023-06-19 | End: 2023-06-21

## 2023-06-19 RX ORDER — SODIUM CHLORIDE, SODIUM LACTATE, POTASSIUM CHLORIDE, CALCIUM CHLORIDE 600; 310; 30; 20 MG/100ML; MG/100ML; MG/100ML; MG/100ML
INJECTION, SOLUTION INTRAVENOUS CONTINUOUS
Status: DISCONTINUED | OUTPATIENT
Start: 2023-06-19 | End: 2023-06-21

## 2023-06-19 RX ORDER — OXYTOCIN/RINGER'S LACTATE 30/500 ML
95 PLASTIC BAG, INJECTION (ML) INTRAVENOUS ONCE AS NEEDED
Status: DISCONTINUED | OUTPATIENT
Start: 2023-06-19 | End: 2023-06-23 | Stop reason: HOSPADM

## 2023-06-19 RX ORDER — MISOPROSTOL 100 UG/1
800 TABLET ORAL ONCE AS NEEDED
Status: CANCELLED | OUTPATIENT
Start: 2023-06-19

## 2023-06-19 RX ORDER — OXYTOCIN/RINGER'S LACTATE 30/500 ML
0-30 PLASTIC BAG, INJECTION (ML) INTRAVENOUS CONTINUOUS
Status: DISCONTINUED | OUTPATIENT
Start: 2023-06-20 | End: 2023-06-23 | Stop reason: HOSPADM

## 2023-06-19 RX ORDER — OXYTOCIN/RINGER'S LACTATE 30/500 ML
95 PLASTIC BAG, INJECTION (ML) INTRAVENOUS ONCE AS NEEDED
Status: CANCELLED | OUTPATIENT
Start: 2023-06-19 | End: 2034-11-15

## 2023-06-19 RX ORDER — MISOPROSTOL 200 UG/1
800 TABLET ORAL ONCE AS NEEDED
Status: COMPLETED | OUTPATIENT
Start: 2023-06-19 | End: 2023-06-21

## 2023-06-19 RX ORDER — MISOPROSTOL 100 MCG
50 TABLET ORAL EVERY 4 HOURS PRN
Status: CANCELLED | OUTPATIENT
Start: 2023-06-19

## 2023-06-19 RX ORDER — OXYTOCIN/RINGER'S LACTATE 30/500 ML
0-30 PLASTIC BAG, INJECTION (ML) INTRAVENOUS CONTINUOUS
Status: CANCELLED | OUTPATIENT
Start: 2023-06-20

## 2023-06-19 RX ORDER — SIMETHICONE 80 MG
1 TABLET,CHEWABLE ORAL 4 TIMES DAILY PRN
Status: CANCELLED | OUTPATIENT
Start: 2023-06-19

## 2023-06-19 RX ADMIN — MISOPROSTOL 50 MCG: 100 TABLET ORAL at 10:06

## 2023-06-19 RX ADMIN — MISOPROSTOL 50 MCG: 100 TABLET ORAL at 05:06

## 2023-06-19 RX ADMIN — SODIUM CHLORIDE, POTASSIUM CHLORIDE, SODIUM LACTATE AND CALCIUM CHLORIDE: 600; 310; 30; 20 INJECTION, SOLUTION INTRAVENOUS at 04:06

## 2023-06-19 RX ADMIN — SODIUM CHLORIDE, POTASSIUM CHLORIDE, SODIUM LACTATE AND CALCIUM CHLORIDE: 600; 310; 30; 20 INJECTION, SOLUTION INTRAVENOUS at 06:06

## 2023-06-19 RX ADMIN — SODIUM CHLORIDE, POTASSIUM CHLORIDE, SODIUM LACTATE AND CALCIUM CHLORIDE: 600; 310; 30; 20 INJECTION, SOLUTION INTRAVENOUS at 11:06

## 2023-06-19 NOTE — NURSING
Pt arrived to the unit for IOL, ambulating without distress. Pt instructed on taking a Hibiclens shower and calling when finished. Pt verbalizes understanding.

## 2023-06-19 NOTE — PROGRESS NOTES
23 y.o.  at 40w1d here for OB visit.     Pregnancy complicated by:  Gestational hypertension  Maternal obesity - Body mass index is 43.46 kg/m².   Anemia, iron deficiency      OBH - G1  PMH - Maternal obesity  PSH - none  MEDS - PNV  ALL - NKDA  SH - denies tobacco, etoh, drugs  FH - denies congenital defects, aneuploidy   GYN Hx - denies STI, abn pap    Pt of Dr. Salas.  New to me.    Pt has no major complaints today.  Reports active fetus.  Denies vaginal bleeding, leakage of fluid, or contractions.  Denies headaches, vision changes, epigastric pain, or acute swelling.    BP elevated today.   Initial /100 repeat 152/98.  Pt denies headaches, vision changes, epigastric pain, or acute swelling.  Pt likely has gestational hypertension.  No much benefit for expectant management at 40 wks with gestational HTN.  Risks, benefits, and alternatives to IOL reviewed including but not limited to failed induction resulting .  After a lengthy discussion, pt opted IOL and is resolute in her decision to proceed.    Fe supplementation when ready.  Go to L&D now for admission for IOL.  Return prn.  All questions answered, voiced understanding.      Vincent Upton MD

## 2023-06-19 NOTE — H&P
[facility]    Obstetrics & Gynecology  History & Physical      Patient Name:  Renée Rhoades  MRN:  [id number]  Admission Date:  2023  Hospital Length of Stay:  0  Attending Physician:  Vincent Upton MD  Primary Care Provider:  Florencio Pineda MD    Date:  2023    Hospital Course:  IUP at 40w1d for induction of labor secondary to gestational hypertension vs pre-eclampsia    Chief Complaint:  Induction of labor    History of Present Illness:      Renée Rhoades is a 23 y.o.  at 40w1d admitted for induction of labor secondary to labor secondary to gestational hypertension vs pre-eclampsia.  Pt was seen earlier today in clinic.  BP elevated.  Initial /100 repeat 152/98.  Pt denies headaches, vision changes, epigastric pain, or acute swelling.  Pt likely has gestational hypertension.  No much benefit for expectant management at 40 wks with gestational HTN.  Risks, benefits, and alternatives to IOL reviewed including but not limited to failed induction resulting .  After a lengthy discussion, pt opted IOL and is resolute in her decision to proceed.  Pt new to me while on call, primary OB, Dr. Salas.  Pt has no major complaints.    Pt reports active fetal movements.  Denies vaginal bleeding, leakage of fluid, or contractions.    Pregnancy complicated by:  Gestational hypertension diagnosed at 40w1d  Maternal obesity - Body mass index is 43.46 kg/m².   Anemia, iron deficiency      OBH - G1  PMH - Maternal obesity, h/o anxiety/depression  PSH - none  MEDS - PNV  ALL - NKDA  SH - denies tobacco, etoh, drugs  FH - denies congenital defects, aneuploidy   GYN Hx - denies STI, abn pap    Review of Systems   Constitutional: Negative.    HENT: Negative.     Eyes: Negative.    Respiratory: Negative.     Cardiovascular: Negative.    Gastrointestinal: Negative.    Genitourinary: Negative.    Musculoskeletal:  Positive for back pain.   Skin: Negative.    Neurological: Negative.     Endo/Heme/Allergies: Negative.    Psychiatric/Behavioral: Negative.       Physical Exam:     BP: (152-160)/() 152/98    GENERAL:  No acute distress.  Alert and oriented to person, place and time.  HEENT:  Normocephalic, atraumatic, anicteric, moist mucus membranes.  Neck supple without masses.  LUNGS:  Clear normal respiratory effort  HEART:  Regular rate & rhythm with physiologic heart sounds.  ABDOMEN:  Soft, non tender without any guarding, rigidity or rebound. Normoactive bowel sounds.  PELVIC:  Normal female external genitalia without gross lesions, rashes or excoriations. No gross vaginal or cervical lesions.  Adequate pelvis.  Cervix: 1cm dilated/50% effaced/-3 station, no bleeding or LOF.  EXTREMITIES:  Symmetric without cramping, claudication. Trace edema LE. +2 distal pulses.  Full range of motion.  SKIN:  No rashes, good turgor & capillary refill.  NEUROLOGIC:  Grossly intact bilaterally. +2 DTR, symmetric.  PSYCH:  Mood & affect appropriate.         Presentation: cephalic     EFW ~7.5 lb by Leopold's     Assessment/Plan:     23 y.o.  at 40w1d:    Gestational hypertension vs pre-eclampsia dx at 40w1d    Admit to L&D for induction of labor secondary to PIH  Pt was informed of the risks, benefits, alternatives and possible complications to induction of labor (with cervidil, cytotec, pitocin, and/or cummins bulb), vaginal delivery, operative vaginal delivery,  section, blood transfusion, and the possibility of failed labor induction resulting in a  section due to maternal or fetal indications.  All questions were answered to her satisfaction.  Pt  voiced understanding and acceptance of these risks, and wishes to proceed with induction of labor.  Informed consents were obtained for delivery and blood transfusions.  Routine admit labs, preE workup  Consult anesthesia, epidural prn  Treat BP as clinically indicated, IV HTN protocol for severe range BP  Consider mag sulfate if worsening  HTN    Maternal obesity - BMI 43    Discussed implications of obesity on pregnancy, delivery, and post-partum care    Anemia, iron deficiency      PPH risk - medium  Fe supplementation when ready    Plan of care d/w pt, all questions answered, voiced understanding      Vincent Upton MD

## 2023-06-20 ENCOUNTER — ANESTHESIA EVENT (OUTPATIENT)
Dept: OBSTETRICS AND GYNECOLOGY | Facility: HOSPITAL | Age: 23
End: 2023-06-20
Payer: COMMERCIAL

## 2023-06-20 ENCOUNTER — ANESTHESIA (OUTPATIENT)
Dept: OBSTETRICS AND GYNECOLOGY | Facility: HOSPITAL | Age: 23
End: 2023-06-20
Payer: COMMERCIAL

## 2023-06-20 ENCOUNTER — ANESTHESIA EVENT (OUTPATIENT)
Dept: OBSTETRICS AND GYNECOLOGY | Facility: HOSPITAL | Age: 23
End: 2023-06-20

## 2023-06-20 ENCOUNTER — ANESTHESIA (OUTPATIENT)
Dept: OBSTETRICS AND GYNECOLOGY | Facility: HOSPITAL | Age: 23
End: 2023-06-20

## 2023-06-20 PROCEDURE — 11000001 HC ACUTE MED/SURG PRIVATE ROOM

## 2023-06-20 PROCEDURE — 62326 NJX INTERLAMINAR LMBR/SAC: CPT | Performed by: ANESTHESIOLOGY

## 2023-06-20 PROCEDURE — 72100003 HC LABOR CARE, EA. ADDL. 8 HRS

## 2023-06-20 PROCEDURE — 01968 PR INSERT CATH,ART,PERCUT,SHORTTERM: ICD-10-PCS | Mod: QY,ANES,, | Performed by: ANESTHESIOLOGY

## 2023-06-20 PROCEDURE — 63600175 PHARM REV CODE 636 W HCPCS: Performed by: OBSTETRICS & GYNECOLOGY

## 2023-06-20 PROCEDURE — 59514 PRA REAN DELIVERY ONLY: ICD-10-PCS | Mod: QX,CRNA,, | Performed by: NURSE ANESTHETIST, CERTIFIED REGISTERED

## 2023-06-20 PROCEDURE — 51702 INSERT TEMP BLADDER CATH: CPT

## 2023-06-20 PROCEDURE — 59514 CESAREAN DELIVERY ONLY: CPT | Mod: QX,CRNA,, | Performed by: NURSE ANESTHETIST, CERTIFIED REGISTERED

## 2023-06-20 PROCEDURE — 01968 ANES/ANALG CS DLVR NEURAXIAL: CPT | Mod: QY,ANES,, | Performed by: ANESTHESIOLOGY

## 2023-06-20 PROCEDURE — 25000003 PHARM REV CODE 250: Performed by: OBSTETRICS & GYNECOLOGY

## 2023-06-20 PROCEDURE — 59514 CESAREAN DELIVERY ONLY: CPT | Mod: QY,ANES,, | Performed by: ANESTHESIOLOGY

## 2023-06-20 PROCEDURE — 59514 PRA REAN DELIVERY ONLY: ICD-10-PCS | Mod: QY,ANES,, | Performed by: ANESTHESIOLOGY

## 2023-06-20 PROCEDURE — 25000003 PHARM REV CODE 250: Performed by: ANESTHESIOLOGY

## 2023-06-20 PROCEDURE — C1751 CATH, INF, PER/CENT/MIDLINE: HCPCS | Performed by: ANESTHESIOLOGY

## 2023-06-20 PROCEDURE — 01968 ANES/ANALG CS DLVR NEURAXIAL: CPT | Mod: QX,CRNA,, | Performed by: NURSE ANESTHETIST, CERTIFIED REGISTERED

## 2023-06-20 PROCEDURE — 01968 PR INSERT CATH,ART,PERCUT,SHORTTERM: ICD-10-PCS | Mod: QX,CRNA,, | Performed by: NURSE ANESTHETIST, CERTIFIED REGISTERED

## 2023-06-20 PROCEDURE — 27200710 HC EPIDURAL INFUSION PUMP SET: Performed by: ANESTHESIOLOGY

## 2023-06-20 RX ORDER — BUPIVACAINE HYDROCHLORIDE 2.5 MG/ML
INJECTION, SOLUTION INFILTRATION; PERINEURAL
Status: DISCONTINUED | OUTPATIENT
Start: 2023-06-20 | End: 2023-06-21

## 2023-06-20 RX ORDER — MISOPROSTOL 100 MCG
50 TABLET ORAL EVERY 4 HOURS PRN
Status: COMPLETED | OUTPATIENT
Start: 2023-06-20 | End: 2023-06-20

## 2023-06-20 RX ORDER — LIDOCAINE HYDROCHLORIDE AND EPINEPHRINE 15; 5 MG/ML; UG/ML
INJECTION, SOLUTION EPIDURAL
Status: DISCONTINUED | OUTPATIENT
Start: 2023-06-20 | End: 2023-06-21

## 2023-06-20 RX ORDER — FENTANYL/BUPIVACAINE/NS/PF 2MCG/ML-.1
PLASTIC BAG, INJECTION (ML) INJECTION CONTINUOUS PRN
Status: DISCONTINUED | OUTPATIENT
Start: 2023-06-20 | End: 2023-06-21

## 2023-06-20 RX ADMIN — BUPIVACAINE HYDROCHLORIDE 4 ML: 2.5 INJECTION, SOLUTION INFILTRATION; PERINEURAL at 09:06

## 2023-06-20 RX ADMIN — MISOPROSTOL 50 MCG: 100 TABLET ORAL at 02:06

## 2023-06-20 RX ADMIN — Medication 2 MILLI-UNITS/MIN: at 07:06

## 2023-06-20 RX ADMIN — LIDOCAINE HYDROCHLORIDE,EPINEPHRINE BITARTRATE 4 ML: 15; .005 INJECTION, SOLUTION EPIDURAL; INFILTRATION; INTRACAUDAL; PERINEURAL at 09:06

## 2023-06-20 RX ADMIN — MISOPROSTOL 50 MCG: 100 TABLET ORAL at 06:06

## 2023-06-20 RX ADMIN — MISOPROSTOL 50 MCG: 100 TABLET ORAL at 10:06

## 2023-06-20 RX ADMIN — Medication 10 ML/HR: at 09:06

## 2023-06-20 NOTE — PROGRESS NOTES
[facility]  Obstetrics & Gynecology  Progress Note    2023    23 y.o.  at 40w2d admitted 2023 for induction of labor secondary to gestational hypertension    Labor course:    2023     40w1d admitted for IOL secondary to gestational hypertension  BP mostly mild range  Induction initiated with oral cytotec    2023    40w2d  No acute events overnight   Reports active fetus  Denies vaginal bleeding, leakage of fluid  S/p epidural ~930AM  S/p 6 doses of cytotec, currently on last dose  BP mostly mild range    Pt comfortable  No vaginal bleeding or leakage of fluid  Active fetus  Comfortable after epidural  Significant other present at bedside    Temp:  [97.7 °F (36.5 °C)-98.9 °F (37.2 °C)] 97.7 °F (36.5 °C)  Pulse:  [] 69  Resp:  [16-18] 16  SpO2:  [96 %-100 %] 100 %  BP: (104-175)/(52-93) 131/85    Lungs: Normal respiratory effort  Heart:  Regular rate and rhythm  Abdomen:  Soft/NT   Cervix:  2/70%/-3 per RN, no bleeding or LOF  Extremeties:  No cramping, claudication, trace edema, +2 distal pulses, symmetric    NST:  Cat 1  [place]:  irregular ctx    Continue active mgt of labor  Start pitocin later tonight  Continue to monitor bp as clinically indicated  Plan of care discussed, all questions answered, voiced understanding      Vincent Upton MD

## 2023-06-20 NOTE — ANESTHESIA PREPROCEDURE EVALUATION
2023  Renée Rhoades is a 23 y.o., female.  Pre-operative evaluation for EPIDURAL.    G1 @ 40 and 2 wga for IOL.    Vitals:    23 0830   BP:    Pulse: (!) 53   Resp:    Temp:          Renée Rhoades is a 23 y.o. female     Patient Active Problem List   Diagnosis    Syncope    Obesity complicating pregnancy, third trimester       Review of patient's allergies indicates:  No Known Allergies    No current facility-administered medications on file prior to encounter.     Current Outpatient Medications on File Prior to Encounter   Medication Sig Dispense Refill    azithromycin (Z-SHAWN) 250 MG tablet Take 4 tabs by mouth as one dose at one time. (Patient not taking: Reported on 2023) 4 tablet 0    clindamycin (CLEOCIN) 300 MG capsule Take 300 mg by mouth every 6 (six) hours.      fluticasone propionate (FLONASE) 50 mcg/actuation nasal spray 1 spray (50 mcg total) by Each Nostril route 2 (two) times daily. (Patient not taking: Reported on 2022) 15.8 mL 0       History reviewed. No pertinent surgical history.    Social History     Socioeconomic History    Marital status: Single   Tobacco Use    Smoking status: Never     Passive exposure: Yes    Smokeless tobacco: Never   Substance and Sexual Activity    Alcohol use: Never    Drug use: Never    Sexual activity: Yes         CBC:   Recent Labs     23  1630   WBC 9.33   RBC 4.54   HGB 11.5*   HCT 34.6*      MCV 76*   MCH 25.3*   MCHC 33.2       CMP:   Recent Labs     23  1630      K 3.9      CO2 20*   BUN 6   CREATININE 0.7   GLU 73   CALCIUM 9.1   ALBUMIN 3.0*   PROT 6.9   ALKPHOS 101   ALT 14   AST 16   BILITOT 0.4       INR  No results for input(s): PT, INR, PROTIME, APTT in the last 72 hours.        Diagnostic Studies:      EKD Echo:  No results found for this or any previous visit.        Pre-op  Assessment    I have reviewed the Patient Summary Reports.    I have reviewed the NPO Status.   I have reviewed the Medications.     Review of Systems  Anesthesia Hx:  No problems with previous Anesthesia  Neg history of prior surgery. Denies Family Hx of Anesthesia complications.   Denies Personal Hx of Anesthesia complications.   Social:  Non-Smoker    Hematology/Oncology:  Hematology Normal   Oncology Normal     EENT/Dental:EENT/Dental Normal   Cardiovascular:  Cardiovascular Normal Exercise tolerance: good     Pulmonary:  Pulmonary Normal    Renal/:  Renal/ Normal     Hepatic/GI:  Hepatic/GI Normal    Neurological:  Neurology Normal    Endocrine:  Morbid Obesity / BMI > 40  Psych:   Psychiatric History          Physical Exam  General: Cooperative, Alert and Oriented    Airway:  Mallampati: III   Mouth Opening: Normal  TM Distance: Normal  Tongue: Normal  Neck ROM: Normal ROM    Dental:  Intact        Anesthesia Plan  Type of Anesthesia, risks & benefits discussed:    Anesthesia Type: Epidural  Intra-op Monitoring Plan: Standard ASA Monitors  Post Op Pain Control Plan: epidural analgesia  Informed Consent: Informed consent signed with the Patient and all parties understand the risks and agree with anesthesia plan.  All questions answered. Patient consented to blood products? Yes  ASA Score: 3    Ready For Surgery From Anesthesia Perspective.     .

## 2023-06-20 NOTE — NURSING
Report rec'd from JAKOB Lopez. Rounding done on pt. SO and family member at bedside. Pt feeling some contx 2/10 on pain scale. VSS. Awaiting urine collection for P/C ratio. Bolus infusing at this time. US placed to pick FHT adequately. HECTOR remains on tracing contx.

## 2023-06-20 NOTE — ANESTHESIA PROCEDURE NOTES
Epidural    Patient location during procedure: OB   Reason for block: primary anesthetic   Reason for block: labor analgesia requested by patient and obstetrician  Diagnosis: IUP-Labor   Start time: 6/20/2023 9:05 AM  Timeout: 6/20/2023 9:05 AM  End time: 6/20/2023 9:11 AM    Staffing  Performing Provider: Trish Mackenzie MD  Authorizing Provider: Trish Mackenzie MD        Preanesthetic Checklist  Completed: patient identified, IV checked, site marked, risks and benefits discussed, surgical consent, monitors and equipment checked, pre-op evaluation, timeout performed, anesthesia consent given, hand hygiene performed and patient being monitored  Preparation  Patient position: sitting  Prep: ChloraPrep  Patient monitoring: Pulse Ox and Blood Pressure  Reason for block: primary anesthetic   Epidural  Skin Anesthetic: lidocaine 1%  Skin Wheal: 3 mL  Administration type: continuous  Approach: midline  Interspace: L2-3    Injection technique: CANDY saline  Needle and Epidural Catheter  Needle type: Tuohy   Needle gauge: 17  Needle length: 3.5 inches  Needle insertion depth: 9 cm  Catheter type: springwound and multi-orifice  Catheter size: 19 G  Catheter at skin depth: 15 cm  Insertion Attempts: 1  Test dose: 3 mL of lidocaine 1.5% with Epi 1-to-200,000  Additional Documentation: incremental injection, no paresthesia on injection, no significant pain on injection, no signs/symptoms of IV or SA injection, negative aspiration for heme and CSF and no significant complaints from patient  Needle localization: anatomical landmarks  Assessment  Upper dermatomal levels - Left: T10  Right: T10   Dermatomal levels determined by alcohol wipe  Ease of block: easy  Patient's tolerance of the procedure: comfortable throughout block and no complaints No inadvertent dural puncture with Tuohy.  Dural puncture not performed with spinal needle

## 2023-06-21 PROBLEM — Z3A.40 40 WEEKS GESTATION OF PREGNANCY: Status: ACTIVE | Noted: 2023-06-21

## 2023-06-21 PROBLEM — O13.3 GESTATIONAL HYPERTENSION, THIRD TRIMESTER: Status: ACTIVE | Noted: 2023-06-21

## 2023-06-21 PROBLEM — Z98.891 S/P CESAREAN SECTION: Status: ACTIVE | Noted: 2023-06-21

## 2023-06-21 LAB — RPR SER QL: NORMAL

## 2023-06-21 PROCEDURE — 25000003 PHARM REV CODE 250: Performed by: OBSTETRICS & GYNECOLOGY

## 2023-06-21 PROCEDURE — 36004724 HC OB OR TIME LEV III - 1ST 15 MIN: Performed by: OBSTETRICS & GYNECOLOGY

## 2023-06-21 PROCEDURE — 59510 CESAREAN DELIVERY: CPT | Mod: GB,,, | Performed by: OBSTETRICS & GYNECOLOGY

## 2023-06-21 PROCEDURE — 63600175 PHARM REV CODE 636 W HCPCS: Performed by: NURSE ANESTHETIST, CERTIFIED REGISTERED

## 2023-06-21 PROCEDURE — 36004725 HC OB OR TIME LEV III - EA ADD 15 MIN: Performed by: OBSTETRICS & GYNECOLOGY

## 2023-06-21 PROCEDURE — 37000008 HC ANESTHESIA 1ST 15 MINUTES: Performed by: OBSTETRICS & GYNECOLOGY

## 2023-06-21 PROCEDURE — 37000009 HC ANESTHESIA EA ADD 15 MINS: Performed by: OBSTETRICS & GYNECOLOGY

## 2023-06-21 PROCEDURE — 59510 PR FULL ROUT OBSTE CARE,CESAREAN DELIV: ICD-10-PCS | Mod: GB,,, | Performed by: OBSTETRICS & GYNECOLOGY

## 2023-06-21 PROCEDURE — 71000039 HC RECOVERY, EACH ADD'L HOUR: Performed by: OBSTETRICS & GYNECOLOGY

## 2023-06-21 PROCEDURE — 63600175 PHARM REV CODE 636 W HCPCS: Performed by: OBSTETRICS & GYNECOLOGY

## 2023-06-21 PROCEDURE — 71000033 HC RECOVERY, INTIAL HOUR: Performed by: OBSTETRICS & GYNECOLOGY

## 2023-06-21 PROCEDURE — 25000003 PHARM REV CODE 250: Performed by: NURSE ANESTHETIST, CERTIFIED REGISTERED

## 2023-06-21 PROCEDURE — 11000001 HC ACUTE MED/SURG PRIVATE ROOM

## 2023-06-21 PROCEDURE — 72100003 HC LABOR CARE, EA. ADDL. 8 HRS

## 2023-06-21 RX ORDER — IBUPROFEN 400 MG/1
800 TABLET ORAL EVERY 8 HOURS
Status: DISCONTINUED | OUTPATIENT
Start: 2023-06-22 | End: 2023-06-23 | Stop reason: HOSPADM

## 2023-06-21 RX ORDER — NIFEDIPINE 30 MG/1
30 TABLET, EXTENDED RELEASE ORAL DAILY
Status: DISCONTINUED | OUTPATIENT
Start: 2023-06-21 | End: 2023-06-23 | Stop reason: HOSPADM

## 2023-06-21 RX ORDER — PRENATAL WITH FERROUS FUM AND FOLIC ACID 3080; 920; 120; 400; 22; 1.84; 3; 20; 10; 1; 12; 200; 27; 25; 2 [IU]/1; [IU]/1; MG/1; [IU]/1; MG/1; MG/1; MG/1; MG/1; MG/1; MG/1; UG/1; MG/1; MG/1; MG/1; MG/1
1 TABLET ORAL DAILY
Status: DISCONTINUED | OUTPATIENT
Start: 2023-06-21 | End: 2023-06-23 | Stop reason: HOSPADM

## 2023-06-21 RX ORDER — OXYTOCIN/RINGER'S LACTATE 30/500 ML
334 PLASTIC BAG, INJECTION (ML) INTRAVENOUS ONCE AS NEEDED
Status: CANCELLED | OUTPATIENT
Start: 2023-06-21 | End: 2034-11-17

## 2023-06-21 RX ORDER — MISOPROSTOL 200 UG/1
800 TABLET ORAL ONCE AS NEEDED
Status: CANCELLED | OUTPATIENT
Start: 2023-06-21

## 2023-06-21 RX ORDER — CARBOPROST TROMETHAMINE 250 UG/ML
250 INJECTION, SOLUTION INTRAMUSCULAR
Status: DISCONTINUED | OUTPATIENT
Start: 2023-06-21 | End: 2023-06-23 | Stop reason: HOSPADM

## 2023-06-21 RX ORDER — FENTANYL CITRATE 50 UG/ML
INJECTION, SOLUTION INTRAMUSCULAR; INTRAVENOUS
Status: DISCONTINUED | OUTPATIENT
Start: 2023-06-21 | End: 2023-06-21

## 2023-06-21 RX ORDER — ONDANSETRON 8 MG/1
8 TABLET, ORALLY DISINTEGRATING ORAL EVERY 8 HOURS PRN
Status: CANCELLED | OUTPATIENT
Start: 2023-06-21

## 2023-06-21 RX ORDER — SODIUM CHLORIDE 0.9 % (FLUSH) 0.9 %
10 SYRINGE (ML) INJECTION
Status: CANCELLED | OUTPATIENT
Start: 2023-06-21

## 2023-06-21 RX ORDER — BUPIVACAINE HYDROCHLORIDE 5 MG/ML
INJECTION, SOLUTION EPIDURAL; INTRACAUDAL
Status: DISCONTINUED | OUTPATIENT
Start: 2023-06-21 | End: 2023-06-21

## 2023-06-21 RX ORDER — ONDANSETRON HYDROCHLORIDE 2 MG/ML
INJECTION, SOLUTION INTRAMUSCULAR; INTRAVENOUS
Status: DISCONTINUED | OUTPATIENT
Start: 2023-06-21 | End: 2023-06-21

## 2023-06-21 RX ORDER — BISACODYL 10 MG
10 SUPPOSITORY, RECTAL RECTAL ONCE AS NEEDED
Status: DISCONTINUED | OUTPATIENT
Start: 2023-06-21 | End: 2023-06-23 | Stop reason: HOSPADM

## 2023-06-21 RX ORDER — SODIUM CITRATE AND CITRIC ACID MONOHYDRATE 334; 500 MG/5ML; MG/5ML
30 SOLUTION ORAL ONCE
Status: CANCELLED | OUTPATIENT
Start: 2023-06-21 | End: 2023-06-21

## 2023-06-21 RX ORDER — SIMETHICONE 80 MG
1 TABLET,CHEWABLE ORAL EVERY 6 HOURS PRN
Status: DISCONTINUED | OUTPATIENT
Start: 2023-06-21 | End: 2023-06-23 | Stop reason: HOSPADM

## 2023-06-21 RX ORDER — OXYCODONE AND ACETAMINOPHEN 10; 325 MG/1; MG/1
1 TABLET ORAL EVERY 4 HOURS PRN
Status: DISCONTINUED | OUTPATIENT
Start: 2023-06-21 | End: 2023-06-23 | Stop reason: HOSPADM

## 2023-06-21 RX ORDER — MORPHINE SULFATE 0.5 MG/ML
INJECTION, SOLUTION EPIDURAL; INTRATHECAL; INTRAVENOUS
Status: DISCONTINUED | OUTPATIENT
Start: 2023-06-21 | End: 2023-06-21

## 2023-06-21 RX ORDER — CEFAZOLIN SODIUM 2 G/50ML
2 SOLUTION INTRAVENOUS ONCE
Status: COMPLETED | OUTPATIENT
Start: 2023-06-21 | End: 2023-06-21

## 2023-06-21 RX ORDER — ACETAMINOPHEN 10 MG/ML
INJECTION, SOLUTION INTRAVENOUS
Status: DISCONTINUED | OUTPATIENT
Start: 2023-06-21 | End: 2023-06-21

## 2023-06-21 RX ORDER — PROCHLORPERAZINE EDISYLATE 5 MG/ML
5 INJECTION INTRAMUSCULAR; INTRAVENOUS EVERY 6 HOURS PRN
Status: CANCELLED | OUTPATIENT
Start: 2023-06-21

## 2023-06-21 RX ORDER — CARBOPROST TROMETHAMINE 250 UG/ML
250 INJECTION, SOLUTION INTRAMUSCULAR
Status: CANCELLED | OUTPATIENT
Start: 2023-06-21

## 2023-06-21 RX ORDER — OXYCODONE AND ACETAMINOPHEN 5; 325 MG/1; MG/1
1 TABLET ORAL EVERY 4 HOURS PRN
Status: DISCONTINUED | OUTPATIENT
Start: 2023-06-21 | End: 2023-06-23 | Stop reason: HOSPADM

## 2023-06-21 RX ORDER — MISOPROSTOL 200 UG/1
800 TABLET ORAL ONCE AS NEEDED
Status: CANCELLED | OUTPATIENT
Start: 2023-06-21 | End: 2034-11-17

## 2023-06-21 RX ORDER — SODIUM CITRATE AND CITRIC ACID MONOHYDRATE 334; 500 MG/5ML; MG/5ML
SOLUTION ORAL
Status: DISCONTINUED | OUTPATIENT
Start: 2023-06-21 | End: 2023-06-21

## 2023-06-21 RX ORDER — OXYTOCIN 10 [USP'U]/ML
10 INJECTION, SOLUTION INTRAMUSCULAR; INTRAVENOUS ONCE AS NEEDED
Status: CANCELLED | OUTPATIENT
Start: 2023-06-21 | End: 2034-11-17

## 2023-06-21 RX ORDER — KETOROLAC TROMETHAMINE 30 MG/ML
30 INJECTION, SOLUTION INTRAMUSCULAR; INTRAVENOUS EVERY 8 HOURS
Status: COMPLETED | OUTPATIENT
Start: 2023-06-21 | End: 2023-06-22

## 2023-06-21 RX ORDER — DEXAMETHASONE SODIUM PHOSPHATE 4 MG/ML
INJECTION, SOLUTION INTRA-ARTICULAR; INTRALESIONAL; INTRAMUSCULAR; INTRAVENOUS; SOFT TISSUE
Status: DISCONTINUED | OUTPATIENT
Start: 2023-06-21 | End: 2023-06-21

## 2023-06-21 RX ORDER — ADHESIVE BANDAGE
30 BANDAGE TOPICAL 2 TIMES DAILY PRN
Status: DISCONTINUED | OUTPATIENT
Start: 2023-06-22 | End: 2023-06-23 | Stop reason: HOSPADM

## 2023-06-21 RX ORDER — MIDAZOLAM HYDROCHLORIDE 1 MG/ML
INJECTION INTRAMUSCULAR; INTRAVENOUS
Status: DISCONTINUED | OUTPATIENT
Start: 2023-06-21 | End: 2023-06-21

## 2023-06-21 RX ORDER — DIPHENHYDRAMINE HCL 25 MG
25 CAPSULE ORAL EVERY 4 HOURS PRN
Status: DISCONTINUED | OUTPATIENT
Start: 2023-06-21 | End: 2023-06-23 | Stop reason: HOSPADM

## 2023-06-21 RX ORDER — PHENYLEPHRINE HYDROCHLORIDE 10 MG/ML
INJECTION INTRAVENOUS
Status: DISCONTINUED | OUTPATIENT
Start: 2023-06-21 | End: 2023-06-21

## 2023-06-21 RX ORDER — OXYTOCIN 10 [USP'U]/ML
INJECTION, SOLUTION INTRAMUSCULAR; INTRAVENOUS
Status: DISCONTINUED | OUTPATIENT
Start: 2023-06-21 | End: 2023-06-21

## 2023-06-21 RX ORDER — FAMOTIDINE 10 MG/ML
20 INJECTION INTRAVENOUS ONCE
Status: CANCELLED | OUTPATIENT
Start: 2023-06-21 | End: 2023-06-21

## 2023-06-21 RX ORDER — AMOXICILLIN 250 MG
1 CAPSULE ORAL NIGHTLY PRN
Status: DISCONTINUED | OUTPATIENT
Start: 2023-06-21 | End: 2023-06-23 | Stop reason: HOSPADM

## 2023-06-21 RX ORDER — OXYTOCIN/RINGER'S LACTATE 30/500 ML
95 PLASTIC BAG, INJECTION (ML) INTRAVENOUS ONCE AS NEEDED
Status: CANCELLED | OUTPATIENT
Start: 2023-06-21 | End: 2034-11-17

## 2023-06-21 RX ORDER — METOCLOPRAMIDE HYDROCHLORIDE 5 MG/ML
INJECTION INTRAMUSCULAR; INTRAVENOUS
Status: DISCONTINUED | OUTPATIENT
Start: 2023-06-21 | End: 2023-06-21

## 2023-06-21 RX ORDER — TRANEXAMIC ACID 10 MG/ML
1000 INJECTION, SOLUTION INTRAVENOUS ONCE AS NEEDED
Status: CANCELLED | OUTPATIENT
Start: 2023-06-21 | End: 2034-11-17

## 2023-06-21 RX ORDER — OXYTOCIN/RINGER'S LACTATE 30/500 ML
95 PLASTIC BAG, INJECTION (ML) INTRAVENOUS ONCE
Status: COMPLETED | OUTPATIENT
Start: 2023-06-21 | End: 2023-06-21

## 2023-06-21 RX ORDER — LIDOCAINE HCL/EPINEPHRINE/PF 2%-1:200K
VIAL (ML) INJECTION
Status: DISCONTINUED | OUTPATIENT
Start: 2023-06-21 | End: 2023-06-21

## 2023-06-21 RX ORDER — OXYTOCIN/RINGER'S LACTATE 30/500 ML
PLASTIC BAG, INJECTION (ML) INTRAVENOUS CONTINUOUS PRN
Status: DISCONTINUED | OUTPATIENT
Start: 2023-06-21 | End: 2023-06-21

## 2023-06-21 RX ORDER — AZITHROMYCIN 100 MG/ML
INJECTION, POWDER, LYOPHILIZED, FOR SOLUTION INTRAVENOUS
Status: DISCONTINUED | OUTPATIENT
Start: 2023-06-21 | End: 2023-06-21

## 2023-06-21 RX ORDER — NIFEDIPINE 30 MG/1
30 TABLET, EXTENDED RELEASE ORAL DAILY
Status: CANCELLED | OUTPATIENT
Start: 2023-06-21

## 2023-06-21 RX ORDER — DOCUSATE SODIUM 100 MG/1
200 CAPSULE, LIQUID FILLED ORAL 2 TIMES DAILY
Status: DISCONTINUED | OUTPATIENT
Start: 2023-06-21 | End: 2023-06-23 | Stop reason: HOSPADM

## 2023-06-21 RX ORDER — MUPIROCIN 20 MG/G
OINTMENT TOPICAL 2 TIMES DAILY
Status: DISCONTINUED | OUTPATIENT
Start: 2023-06-21 | End: 2023-06-23 | Stop reason: HOSPADM

## 2023-06-21 RX ORDER — METHYLERGONOVINE MALEATE 0.2 MG/ML
200 INJECTION INTRAVENOUS
Status: CANCELLED | OUTPATIENT
Start: 2023-06-21

## 2023-06-21 RX ADMIN — MISOPROSTOL 800 MCG: 200 TABLET ORAL at 07:06

## 2023-06-21 RX ADMIN — PRENATAL VIT W/ FE FUMARATE-FA TAB 27-0.8 MG 1 TABLET: 27-0.8 TAB at 01:06

## 2023-06-21 RX ADMIN — Medication 334 ML/HR: at 09:06

## 2023-06-21 RX ADMIN — DEXAMETHASONE SODIUM PHOSPHATE 8 MG: 4 INJECTION, SOLUTION INTRA-ARTICULAR; INTRALESIONAL; INTRAMUSCULAR; INTRAVENOUS; SOFT TISSUE at 09:06

## 2023-06-21 RX ADMIN — DOCUSATE SODIUM 200 MG: 100 CAPSULE, LIQUID FILLED ORAL at 01:06

## 2023-06-21 RX ADMIN — Medication 95 MILLI-UNITS/MIN: at 04:06

## 2023-06-21 RX ADMIN — AZITHROMYCIN MONOHYDRATE 500 MG: 500 INJECTION, POWDER, LYOPHILIZED, FOR SOLUTION INTRAVENOUS at 08:06

## 2023-06-21 RX ADMIN — SODIUM CITRATE AND CITRIC ACID MONOHYDRATE 30 ML: 500; 334 SOLUTION ORAL at 08:06

## 2023-06-21 RX ADMIN — NIFEDIPINE 30 MG: 30 TABLET, FILM COATED, EXTENDED RELEASE ORAL at 06:06

## 2023-06-21 RX ADMIN — LIDOCAINE HYDROCHLORIDE AND EPINEPHRINE 5 ML: 20; 5 INJECTION, SOLUTION EPIDURAL; INFILTRATION; INTRACAUDAL; PERINEURAL at 08:06

## 2023-06-21 RX ADMIN — AZITHROMYCIN 500 MG: 500 INJECTION, POWDER, LYOPHILIZED, FOR SOLUTION INTRAVENOUS at 09:06

## 2023-06-21 RX ADMIN — ACETAMINOPHEN 1000 MG: 10 INJECTION, SOLUTION INTRAVENOUS at 09:06

## 2023-06-21 RX ADMIN — LIDOCAINE HYDROCHLORIDE AND EPINEPHRINE 2 ML: 20; 5 INJECTION, SOLUTION EPIDURAL; INFILTRATION; INTRACAUDAL; PERINEURAL at 08:06

## 2023-06-21 RX ADMIN — METOCLOPRAMIDE 10 MG: 5 INJECTION, SOLUTION INTRAMUSCULAR; INTRAVENOUS at 08:06

## 2023-06-21 RX ADMIN — ONDANSETRON 8 MG: 8 TABLET, ORALLY DISINTEGRATING ORAL at 01:06

## 2023-06-21 RX ADMIN — SODIUM CHLORIDE, POTASSIUM CHLORIDE, SODIUM LACTATE AND CALCIUM CHLORIDE: 600; 310; 30; 20 INJECTION, SOLUTION INTRAVENOUS at 12:06

## 2023-06-21 RX ADMIN — OXYCODONE AND ACETAMINOPHEN 1 TABLET: 5; 325 TABLET ORAL at 05:06

## 2023-06-21 RX ADMIN — MIDAZOLAM HYDROCHLORIDE 2 MG: 1 INJECTION, SOLUTION INTRAMUSCULAR; INTRAVENOUS at 09:06

## 2023-06-21 RX ADMIN — CEFAZOLIN SODIUM 2 G: 2 SOLUTION INTRAVENOUS at 08:06

## 2023-06-21 RX ADMIN — MUPIROCIN: 20 OINTMENT TOPICAL at 09:06

## 2023-06-21 RX ADMIN — KETOROLAC TROMETHAMINE 30 MG: 30 INJECTION, SOLUTION INTRAMUSCULAR; INTRAVENOUS at 01:06

## 2023-06-21 RX ADMIN — MORPHINE SULFATE 1.5 MG: 0.5 INJECTION, SOLUTION EPIDURAL; INTRATHECAL; INTRAVENOUS at 09:06

## 2023-06-21 RX ADMIN — CARBOPROST TROMETHAMINE 250 MCG: 250 INJECTION, SOLUTION INTRAMUSCULAR at 07:06

## 2023-06-21 RX ADMIN — MUPIROCIN: 20 OINTMENT TOPICAL at 01:06

## 2023-06-21 RX ADMIN — ONDANSETRON 4 MG: 2 INJECTION INTRAMUSCULAR; INTRAVENOUS at 08:06

## 2023-06-21 RX ADMIN — BUPIVACAINE HYDROCHLORIDE 4 ML: 5 INJECTION, SOLUTION EPIDURAL; INTRACAUDAL; PERINEURAL at 08:06

## 2023-06-21 RX ADMIN — PHENYLEPHRINE HYDROCHLORIDE 200 MCG: 10 INJECTION INTRAVENOUS at 09:06

## 2023-06-21 RX ADMIN — PHENYLEPHRINE HYDROCHLORIDE 100 MCG: 10 INJECTION INTRAVENOUS at 08:06

## 2023-06-21 RX ADMIN — FENTANYL CITRATE 100 MCG: 0.05 INJECTION, SOLUTION INTRAMUSCULAR; INTRAVENOUS at 09:06

## 2023-06-21 RX ADMIN — KETOROLAC TROMETHAMINE 30 MG: 30 INJECTION, SOLUTION INTRAMUSCULAR; INTRAVENOUS at 10:06

## 2023-06-21 RX ADMIN — SODIUM CHLORIDE, SODIUM LACTATE, POTASSIUM CHLORIDE, AND CALCIUM CHLORIDE: .6; .31; .03; .02 INJECTION, SOLUTION INTRAVENOUS at 09:06

## 2023-06-21 RX ADMIN — OXYTOCIN 40 UNITS: 10 INJECTION, SOLUTION INTRAMUSCULAR; INTRAVENOUS at 09:06

## 2023-06-21 NOTE — NURSING
Dr. Upton notified of pt status, late decels and inability to start pitocin at this time. Orders to start pitocin when strip becomes reactive.

## 2023-06-21 NOTE — TRANSFER OF CARE
"Anesthesia Transfer of Care Note    Patient: Renée Rhoades    Procedure(s) Performed: Procedure(s) (LRB):   SECTION (N/A)    Patient location: Labor and Delivery    Anesthesia Type: epidural    Transport from OR: Transported from OR on room air with adequate spontaneous ventilation    Post pain: adequate analgesia    Post assessment: no apparent anesthetic complications and tolerated procedure well    Post vital signs: stable    Level of consciousness: awake, alert and oriented    Nausea/Vomiting: no nausea/vomiting    Complications: none    Transfer of care protocol was followed      Last vitals:   Visit Vitals  BP (!) 142/68   Pulse 62   Temp 36.7 °C (98.1 °F) (Oral)   Resp 18   Ht 5' 5" (1.651 m)   Wt 118.5 kg (261 lb 3.9 oz)   LMP 2022   SpO2 99%   Breastfeeding No   BMI 43.47 kg/m²     "

## 2023-06-21 NOTE — PROGRESS NOTES
Called Dr. Upton to notify of pt vaginal bleeding, trickling X 3. S. Stanley at bedside,check pt no clots expressed, fundus firm at umbilicus. Lisa care performed, instructed pt on importance of notifying nurse if felt clots and tricking. Will continue to monitor.New orders received from Dr. Upton .

## 2023-06-21 NOTE — PROGRESS NOTES
Discussed incisional care and infection prevention. Reviewed use of Hibiclens and provided Hibiclens packets and written instructions. Patient verbalized understanding.

## 2023-06-21 NOTE — DISCHARGE INSTRUCTIONS
Breastfeeding discharge instructions given with First Alert form and reviewed. Please complete First Alert within 3-5 days after the baby's birth. ( Please call the Breastfeeding Warmline ( [phone]) or the baby's pediatrician if you have any concerns.     Also discussed:  AAP recommendation of exclusive breastfeeding for the first 6 months of life and continued breastfeeding with the introduction of supplemental foods beyond the first year of life. The AAP recommends breastfeeding for for at least a year or longer. Instructed on the recommendation to delay all bottle and pacifier use until after 4 weeks of age and breastfeeding is well established.  Discussed the benefits of exclusive breastfeeding for both mother and baby.  Discussed the risks of supplementation/pacifier use on the exclusivity of breastfeeding in the first 6 months. Feed the baby at the earliest sign of hunger or comfort  Hands to mouth, sucking motions  Rooting or searching for something to suck on  Dont wait for crying - it is a not a late sign of hunger; it is a sign of distress    The feedings may be 8-12 times per 24hrs and will not follow a schedule  Alternate the breast you start the feeding with, or start with the breast that feels the fullest  Switch breasts when the baby takes himself off the breast or falls asleep  Keep offering breasts until the baby looks full, no longer gives hunger signs, and stays asleep when placed on his back in the crib  If the baby is sleepy and wont wake for a feeding, put the baby skin-to-skin dressed in a diaper against the mothers bare chest  Sleep near your baby  The baby should be positioned and latched on to the breast correctly  Chest-to-chest, chin in the breast  Babys lips are flipped outward  Babys mouth is stretched open wide like a shout  Babys sucking should feel like tugging to the mother  The baby should be drinking at the breast:  You should hear swallowing or gulping throughout  the feeding  You should see milk on the babys lips when he comes off the breast  Your breasts should be softer when the baby is finished feeding  The baby should look relaxed at the end of feedings  After the 4th day and your milk is in:  The babys poop should turn bright yellow and be loose, watery, and seedy  The baby should have at least 3-4 poops the size of the palm of your hand per day  The baby should have at least 6-8 wet diapers per day  The urine should be light yellow in color  You should drink when you are thirsty and eat a healthy diet when you are    hungry.     Take naps to get the rest you need.   Take medications and/or drink alcohol only with permission of your obstetrician    or the babys pediatrician.  You can also call the [facility],   ([phone]), Monday-Friday, 8am-5pm Central time, to get the most   up-to-date evidence-based information on the use of medications during   pregnancy and breastfeeding.      The baby should be examined by a pediatrician at 3-5 days of age and again at 2 weeks of age unless ordered sooner by the pediatrician.  Once your milk production increases the baby should gain at least 1/2-1oz each day and should be back to birth weight no later than 10-14 days of age.If this is not the case, please call the Breastfeeding Warmline ( [phone]) for assistance and support. Instructed on primary engorgement and precautions.  Discussed:    Typical timing of the onset of engorgement  Signs and symptoms of engorgement  If the milk is flowing, use wet or dry heat applied to the breasts for approximately 10min prior to each feeding as a comfort measure to facilitate the milk ejection reflex    Follow heat treatment with breast massage to soften hard/lumpy areas of the breast    Use unrestricted, frequent, effective feedings    Wake baby to feed if necessary    Avoid pacifier and bottle feedings    Hand express or pump breasts to the point of comfort as  needed    Use cold treatments in the form of ice packs/gel packs/ frozen vegetables wrapped in a soft thin cloth and applied to the breasts for approximately 20min after each feeding until engorgement is resolved    Wear comfortable, supportive bra    Take pain medicine as needed    Use anti-inflammatory medications if prescribed by physician Preparation and Hygiene:  Shower daily.  Wear a clean bra each day and wash daily in warm soapy water.  Change wet or moist breast pads frequently.  Moist pads can promote growth of germs.  Actively wash your hands, paying close attention to the area around and under your fingernails, thoroughly with soap and water for 15 seconds before pumping or handling your milk.  Re-wash your hands if you touch anything (scratching your nose, answering the phone, etc) while pumping or handling your milk.   Before pumping your breasts, assemble the pump collection kit and have ready the sterile container and labels.  Place these items on a clean surface next to the breast pump.  Each time after you have finished pumping, take apart all of the parts of the breast pump collection kit and place them in a separate cleaning container (do not place them in the sink).  Be sure to remove the yellow valve from the breast shield and separate the white membrane from the yellow valve.  Rinse all of these parts with cool water.  Then use a new sponge and/or bottle brush and dishwashing detergent to clean the parts.  Rinse off the soapy water with cool water and air dry on a clean towel covered with a clean cloth.  All parts may also be washed after each use in the top rack of a .  Once each day, sanitize all of the parts of the breast pump collection kit.  This can be done by boiling the kit parts for 10 minutes or by using a Quick Clean Micro-Steam Bag made by [employer]  If condensation appears in the tubing, continue to run the pump with the tubing attached for 1-2 minutes or until the  tubing is dry.   Notify your babys nurse or doctor if you become ill or need to take any medication, even over-the-counter medicines.  Collection and Storage of Expressed Breast milk:         1. Pump your breasts at least 8 or more times every 24 hours.  Double pump (both breasts at the same time) for at least 15-20 minutes using the most suction that is comfortable.    2. Write the date and time of pumping and the name of any medications you are taking on the babys pre-printed hospital identification label.   3.    Do not touch the inside of the storage containers or lids.  4.        Tightly screw the lid onto the container and place immediately into the refrigerator for daily use.  5.    Expressed breast milk should be refrigerated or frozen within 4 hours of pumping.  6.        Do not store expressed breast milk on the door of your refrigerator or freezer             where the temperature is warmer.   7.        Refrigerated milk may be stored for up to 7 days.  At this point it can be moved to the freezer for 6 -12 months.  8.        Thaw frozen breast milk in overnight in the refrigerator.  Once milk is thawed it must be used within 24 hours.  9.        Refrigerated breast milk needs to be warmed to room temperature.  Warm by leaving unrefrigerated until it reached room temperature, or place sealed bottle into a cup of warm (not boiling) water or use a bottle warmer.               Never warm breast milk in a microwave or boiling water.    For any questions or concerns call the Lactation Department at [phone]     After a Vaginal Birth    General Discharge Instructions    May follow a regular diet, unless otherwise discussed with physician.  Take showers, not baths unless otherwise discussed with physician.  Activity as tolerated.  No lifting or heavy exercise for 6 weeks, no driving for 2 weeks, no sexual intercourse, douching or tampons for 6 weeks  Rhogam injection given  _____  Rubella vaccine given  _____  (avoid pregnancy for 3 mos. after injection)  May return to work/school as discussed with physician  Take medications as directed  Discuss birth control with physician  Breast care support bra worn at all times  Lactation consultant referral number ( [phone] or [phone])    Call Your Healthcare Provider Right Away If You Have:  A temperature of 100.4°F or higher.  If your blood pressure is over 155/105.  You have difficulty catching your breath or trouble breathing.  Heavy vaginal bleeding, clots, or vaginal discharge with foul odor. (heavier than menses)  Persistent nausea or vomiting.  You gain more than 3 pounds in 3 days.  Severe headaches not relieved by Tylenol (acetaminophen) or Motrin (ibuprofen)  Blurry or double vision, see spots or flashing lights.  Dizziness or fainting.  New onset swelling or worsening of existing swelling.  Burning or pain when you urinate.  No bowel movement for 5 days.  Redness, warmth, swelling, or pain in the lower leg.  Redness, discharge, or pain worse than you had in the hospital.  Burning, pain, red streaks, or lumpy areas in your breasts.  Cracks, blisters, or blood on your nipples.  Feelings of extreme sadness or anxiety, or a feeling that you dont want to be with your baby.  If you have any new or unusual symptoms or have questions or concerns    Some of these symptoms can occur up to 4 to 6 weeks after delivery. This can be a sign of pre-eclampsia, which is a serious disease that can cause stroke, seizures, organ damage, or death. Do not wait to call your doctor or seek medical attention.    If You Had Stitches  You may have received stitches in the skin near your vagina. The stitches might have closed an episiotomy (an incision that enlarges the opening of the vagina). Or you may have needed stitches to repair torn skin. Either way, your stitches should dissolve within weeks. Until then, you can help reduce discomfort, aid healing, and reduce your risk of  infection by keeping the stitches clean. These tips can help:    Gently wipe from front to back after you urinate or have a bowel movement.  After wiping, spray warm water on the area. Or you can have a sitz bath. This means sitting in a tub with a few inches of water in it. Then pat the area dry or use a hairdryer on a cool setting.  You can take a shower instead of a bath.  Change sanitary pads at least every 2-4 hours.  Place cold or heat packs on the area as directed by your doctors or nurses. Keep a thin towel between the pack and your skin.  Sit on firm seats so the stitches pull less.     Follow-Up  Schedule a  follow-up exam with your healthcare provider for about 6 weeks after delivery. During this exam, your uterus and vaginal area will be checked. Contact your healthcare provider if you think you or your baby are having any problems.

## 2023-06-21 NOTE — PROGRESS NOTES
Rounded on patient and informed quiet time to start at 1400 until 1600. Informed nurse will not come into room unless called to allow for rest and bonding with infant. Instructed patient to call with needs. Understanding verbalized.

## 2023-06-21 NOTE — L&D DELIVERY NOTE
[facility]   Operative Report  Obstetrics & Gynecology      Date of Surgery:  2023     Surgeon:  Vincent Upton MD      Preoperative diagnosis:     Smith IUP at 40w3d  Abnormal fetal hearth rate tracing   Fetal intolerance to labor  Gestational hypertension  Maternal obesity - BMI 43  Anemia, iron deficiency       Postoperative diagnosis:     Smith IUP at 40w3d s/p primary repeat  section   Live infant  Abnormal fetal hearth rate tracing   Fetal intolerance to labor  Gestational hypertension  Maternal obesity - BMI 43  Anemia, iron deficiency      Procedure performed:  Primary low transverse  section via pfannenstiel skin incision     Anesthesia:  Epidural      IV Fluids:  1000 mL of LR     Urine Output:  350 mL, clear at end of procedure       Quantitative Blood Loss:  890 mL with no replacements     Specimens:  Cord blood    Findings:      Live male infant, APGAR 1 min: 8, 5 min: 9, transfer to well baby  Weight 2790 grams  AROM at time of uterine incision with moderate, clear fluid, no odor with terminal meconium  Grossly normal uterus, tubes and ovaries     Complications:  No immediate complications    Indications for the Procedure:      See chart for complete details.     23 y.o.  at 40w3d admitted 2023 for induction of labor secondary to gestational hypertension     Labor course:     2023      40w1d admitted for IOL secondary to gestational hypertension  BP mostly mild range  Induction initiated with oral cytotec     2023     40w2d  No acute events overnight   BP mostly mild range  Denies headaches, vision changes, epigastric pain, or acute swelling  No vaginal bleeding or leakage of fluid  S/p epidural ~930AM  S/p 6 doses of cytotec, pitocin started     2023     40w3d  No acute events overnight  Not much cervical change overnight  Comfortable  Reports active fetus.  Denies vaginal bleeding, leakage of fluid.  Denies headaches, vision  changes, epigastric pain, or acute swelling  Pitocin was briefly started this morning on pitocin for labor augmentation and was turned off due to decels  Intrauterine resuscitative measures performed with appropriate response   Pt continue to have recurrent subtle late decels  Fetal tracing and labor course d/w pt and mother at bedside  Discussed Risks, benefits, and alternatives to continued trial of labor versus  delivery  After an extensive discussion, pt and family would like to proceed with  delivery  All questions answered, voiced understanding.    Pt was informed of the risks, benefits, alternatives and possible complications to  and blood transfusion including pre-admission, admission, and post-admission procedures and expectations.  Risks of  section included, but were not limited to, death, urinary and/or fecal incontinence, injury to bladder and/or urinary tract, injury to bowel and/or intestinal obstruction, risk of infection, damage to major blood vessels, hemorrhage requiring transfusion of blood products and/or hysterectomy, painful intercourse, ovarian failure requiring hormone administration, pulmonary embolism, fistula formation, sexual dysfunction, hernia, failure of wound to heal, permanent and disfiguring scarring.  In the event of a hysterectomy +/- bilateral salpingo-oophorectomy (BS&O) if uterine/ovarian injury or uncontrollable hemorrhage were to occur, the patient was counseled extensively on the inability to become pregnant following a hysterectomy and in the event of a BS&O will result in surgical menopause.  All of the patients questions were answered to her satisfaction.  Informed consent was obtained for  delivery, blood transfusions and all indicated procedures.     Description of the Procedure:      The patient was taken to the operating room where a time out was performed to confirm the correct patient and correct procedure.  Epidural  anesthesia was found to be adequate.  Pt was then prepped and draped in a normal sterile fashion in the dorsal supine position with a leftward tilt.  A Pfannenstiel skin incision was then made with the scalpel and carried through to the underlying layer of fascia with the Bovie.  The fascia was then incised in the midline and the incision extended laterally with Larson scissors.  The superior aspect of the fascia was then grasped with Kocher clamps, elevated and the underlying rectus muscles were dissected off bluntly.  Attention was then turned to the inferior aspect of the fascial incision which, in a similar fashion, was grasped, tented up with Kocher clamps, and the rectus muscles were dissected off bluntly.  The rectus muscles were then  in the midline, and the peritoneum identified, tented up, and entered sharply with Metzenbaum scissors.  The peritoneal incision was then extended superiorly and inferiorly with good visualization of the bladder.  A low transverse uterine incision was made well above the bladder reflection with the scalpel.  The uterine incision was extended cephalo-caudad fashion and the infant's head delivered atraumatically followed by the remainder of the body without difficulty.  The mouth and nose were suctioned and the cord clamped and cut.  The infant was vigorous with a strong cry and handed to the awaiting NICU NNP.  Cord blood was obtained.  The placenta was removed spontaneously; the uterus was exteriorized and cleared of all clots and debris.  The uterine incision was repaired with 1-0 Monocryl in a running, locked fashion.  A second layer of the same suture was used to obtain excellent hemostasis.  The uterus was returned to the abdomen. The gutters were cleaned of all clots and debris.  Hemostasis was noted.  The rectus muscles were reapproximated with 2-0 chromic.  The fascia was reapproximated with 0 vicryl in a running fashion.  The subcutaneous fat was reapproximated  with 2-0 plain gut.  The skin was closed with absorbable Insorb staples.  Aquacel bandage dressing was applied to incision.  The patient tolerated the procedure well.  Sponge, lap and needle counts were correct time two.  Surgical prophylactic antibiotic was given prior to the procedure.  The patient was transferred to the L&D recovery room in stable condition.  Findings and expectations were discussed with patient.  All of her questions were answered to her satisfaction, patient voiced understanding.       Vincent Upton MD

## 2023-06-21 NOTE — PROGRESS NOTES
[facility]  Obstetrics & Gynecology  Progress Note    2023    23 y.o.  at 40w3d admitted 2023 for induction of labor secondary to gestational hypertension    Labor course:    2023     40w1d admitted for IOL secondary to gestational hypertension  BP mostly mild range  Induction initiated with oral cytotec    2023    40w2d  No acute events overnight   BP mostly mild range  Denies headaches, vision changes, epigastric pain, or acute swelling  No vaginal bleeding or leakage of fluid  S/p epidural ~930AM  S/p 6 doses of cytotec, pitocin started    2023    40w3d  No acute events overnight  Not much cervical change overnight  Comfortable  Reports active fetus.  Denies vaginal bleeding, leakage of fluid.  Denies headaches, vision changes, epigastric pain, or acute swelling  Pitocin was briefly started this morning on pitocin for labor augmentation and was turned off due to decels  Intrauterine resuscitative measures performed with appropriate response   Pt continue to have recurrent subtle late decels  Fetal tracing and labor course d/w pt and mother at bedside  Discussed Risks, benefits, and alternatives to continued trial of labor versus  delivery  After an extensive discussion, pt and family would like to proceed with  delivery  All questions answered, voiced understanding.    Temp:  [98.1 °F (36.7 °C)-98.7 °F (37.1 °C)] 98.7 °F (37.1 °C)  Pulse:  [] 56  Resp:  [18] 18  SpO2:  [92 %-100 %] 96 %  BP: (116-167)/(57-92) 133/75    Lungs: Normal respiratory effort  Heart:  Regular rate and rhythm  Abdomen:  Soft/NT   Cervix:  3/70%/-3, no bleeding or LOF  Extremeties:  No cramping, claudication, trace edema, +2 distal pulses, symmetric    NST:  Cat 2 with subtle late decels  [place]:  irregular ctx    Will proceed with  secondary to abnormal fetal heart tracing  Risks, benefits, and alternatives to  discussed  Informed consent  signed  OR/anesthesia notified  NICU aware   Plan of care discussed with pt/pt mother, all questions answered, voiced understanding.      Vincent Upton MD

## 2023-06-21 NOTE — LACTATION NOTE
06/21/23 1010   Maternal Assessment   Breast Density Bilateral:;soft   Areola Bilateral:;elastic   Nipples Bilateral:;everted   Maternal Infant Feeding   Maternal Emotional State assist needed   Infant Positioning cradle   Signs of Milk Transfer audible swallow;infant jaw motion present   Pain with Feeding no   Latch Assistance yes     Mother with baby skin to skin and rooting for the breast -mother assisted with positioning and baby latches with minimal assist for strong sucking and swallows -mother denies discomfort -review basic breastfeeding information with parents -encouraged call for any assistance

## 2023-06-21 NOTE — PLAN OF CARE
VSS. Afebrile  FF @ the umbilicus. Light rubra lochia. No clots. No odor.  LTV incision, well approximated with steri strips. No drainage noted.   Tolerating regular diet without any GI symptoms voiced.  [place] in place. Denies flatus. Denies BM.  Pain controlled with PRN meds.  Appropriate maternal bonding noted.   Plan of care reviewed with patient. All questions answered.

## 2023-06-22 LAB
BASOPHILS # BLD AUTO: 0.02 K/UL (ref 0–0.2)
BASOPHILS NFR BLD: 0.1 % (ref 0–1.9)
DIFFERENTIAL METHOD: ABNORMAL
EOSINOPHIL # BLD AUTO: 0.1 K/UL (ref 0–0.5)
EOSINOPHIL NFR BLD: 0.5 % (ref 0–8)
ERYTHROCYTE [DISTWIDTH] IN BLOOD BY AUTOMATED COUNT: 16.5 % (ref 11.5–14.5)
HCT VFR BLD AUTO: 29.5 % (ref 37–48.5)
HGB BLD-MCNC: 10 G/DL (ref 12–16)
IMM GRANULOCYTES # BLD AUTO: 0.11 K/UL (ref 0–0.04)
IMM GRANULOCYTES NFR BLD AUTO: 0.8 % (ref 0–0.5)
LYMPHOCYTES # BLD AUTO: 1.5 K/UL (ref 1–4.8)
LYMPHOCYTES NFR BLD: 10.6 % (ref 18–48)
MCH RBC QN AUTO: 26.5 PG (ref 27–31)
MCHC RBC AUTO-ENTMCNC: 33.9 G/DL (ref 32–36)
MCV RBC AUTO: 78 FL (ref 82–98)
MONOCYTES # BLD AUTO: 1.4 K/UL (ref 0.3–1)
MONOCYTES NFR BLD: 9.6 % (ref 4–15)
NEUTROPHILS # BLD AUTO: 11.3 K/UL (ref 1.8–7.7)
NEUTROPHILS NFR BLD: 78.4 % (ref 38–73)
NRBC BLD-RTO: 0 /100 WBC
PLATELET # BLD AUTO: 265 K/UL (ref 150–450)
PMV BLD AUTO: 10.8 FL (ref 9.2–12.9)
RBC # BLD AUTO: 3.78 M/UL (ref 4–5.4)
WBC # BLD AUTO: 14.38 K/UL (ref 3.9–12.7)

## 2023-06-22 PROCEDURE — 63600175 PHARM REV CODE 636 W HCPCS: Performed by: OBSTETRICS & GYNECOLOGY

## 2023-06-22 PROCEDURE — 11000001 HC ACUTE MED/SURG PRIVATE ROOM

## 2023-06-22 PROCEDURE — 25000003 PHARM REV CODE 250: Performed by: OBSTETRICS & GYNECOLOGY

## 2023-06-22 PROCEDURE — 99024 PR POST-OP FOLLOW-UP VISIT: ICD-10-PCS | Mod: ,,, | Performed by: OBSTETRICS & GYNECOLOGY

## 2023-06-22 PROCEDURE — 99024 POSTOP FOLLOW-UP VISIT: CPT | Mod: ,,, | Performed by: OBSTETRICS & GYNECOLOGY

## 2023-06-22 PROCEDURE — 36415 COLL VENOUS BLD VENIPUNCTURE: CPT | Performed by: OBSTETRICS & GYNECOLOGY

## 2023-06-22 PROCEDURE — 85025 COMPLETE CBC W/AUTO DIFF WBC: CPT | Performed by: OBSTETRICS & GYNECOLOGY

## 2023-06-22 RX ADMIN — NIFEDIPINE 30 MG: 30 TABLET, FILM COATED, EXTENDED RELEASE ORAL at 08:06

## 2023-06-22 RX ADMIN — KETOROLAC TROMETHAMINE 30 MG: 30 INJECTION, SOLUTION INTRAMUSCULAR; INTRAVENOUS at 06:06

## 2023-06-22 RX ADMIN — DOCUSATE SODIUM 200 MG: 100 CAPSULE, LIQUID FILLED ORAL at 09:06

## 2023-06-22 RX ADMIN — MUPIROCIN: 20 OINTMENT TOPICAL at 08:06

## 2023-06-22 RX ADMIN — IBUPROFEN 800 MG: 400 TABLET ORAL at 10:06

## 2023-06-22 RX ADMIN — MUPIROCIN: 20 OINTMENT TOPICAL at 09:06

## 2023-06-22 RX ADMIN — DOCUSATE SODIUM 200 MG: 100 CAPSULE, LIQUID FILLED ORAL at 08:06

## 2023-06-22 RX ADMIN — IBUPROFEN 800 MG: 400 TABLET ORAL at 02:06

## 2023-06-22 RX ADMIN — PRENATAL VIT W/ FE FUMARATE-FA TAB 27-0.8 MG 1 TABLET: 27-0.8 TAB at 08:06

## 2023-06-22 NOTE — ANESTHESIA POSTPROCEDURE EVALUATION
Anesthesia Post Evaluation    Patient: Renée Rhoades    Procedure(s) Performed: Procedure(s) (LRB):   SECTION (N/A)    Final Anesthesia Type: epidural      Patient location during evaluation: labor & delivery  Patient participation: Yes- Able to Participate  Level of consciousness: awake and alert, oriented and awake  Post-procedure vital signs: reviewed and stable  Airway patency: patent    PONV status at discharge: No PONV  Anesthetic complications: no      Cardiovascular status: blood pressure returned to baseline  Respiratory status: unassisted, spontaneous ventilation and room air  Hydration status: euvolemic  Follow-up not needed.          Vitals Value Taken Time   /63 23 1121   Temp 36.8 °C (98.3 °F) 23 1121   Pulse 84 23 1121   Resp 20 23 1121   SpO2 98 % 23 1121         Event Time   Out of Recovery 2023 12:00:00         Pain/Toney Score: Pain Rating Prior to Med Admin: 3 (2023  2:53 PM)  Pain Rating Post Med Admin: 0 (2023  8:15 AM)

## 2023-06-22 NOTE — LACTATION NOTE
06/22/23 0845   Maternal Assessment   Breast Density Bilateral:;soft   Areola Bilateral:;elastic   Nipples Bilateral:;everted   Maternal Infant Feeding   Maternal Emotional State independent;relaxed   Pain with Feeding no   Latch Assistance no     Mother breastfeeding independently -states baby latching better overnight and denies any nipple pain -encouraged call for any assistance today

## 2023-06-22 NOTE — PLAN OF CARE
Problem: Adult Inpatient Plan of Care  Goal: Plan of Care Review  Outcome: Adequate for Care Transition  Flowsheets (Taken 2023 043)  Plan of Care Reviewed With: patient     Problem: Adult Inpatient Plan of Care  Goal: Patient-Specific Goal (Individualized)  Outcome: Adequate for Care Transition  Flowsheets (Taken 2023 043)  Anxieties, Fears or Concerns: Breastfeedign help  Individualized Care Needs: Pain control     Problem: Adult Inpatient Plan of Care  Goal: Absence of Hospital-Acquired Illness or Injury  Outcome: Adequate for Care Transition     Problem: Adult Inpatient Plan of Care  Goal: Optimal Comfort and Wellbeing  Outcome: Adequate for Care Transition     Problem: Adult Inpatient Plan of Care  Goal: Readiness for Transition of Care  Outcome: Adequate for Care Transition     Problem:  Fall Injury Risk  Goal: Absence of Fall, Infant Drop and Related Injury  Outcome: Adequate for Care Transition     Problem: Bleeding (Postpartum  Delivery)  Goal: Hemostasis  Outcome: Adequate for Care Transition     Problem: Infection (Postpartum  Delivery)  Goal: Absence of Infection Signs and Symptoms  Outcome: Adequate for Care Transition     Problem: Breastfeeding  Goal: Effective Breastfeeding  Outcome: Adequate for Care Transition

## 2023-06-22 NOTE — PROGRESS NOTES
"[facility]    Obstetrics & Gynecology  Progress Note      Patient Name:  Renée Rhoades  MRN:  [id number]  Admission Date:  2023  Hospital Length of Stay:  3  Attending Physician:  Vincent Upton MD  Primary Care Provider:  Florencio Pineda MD    Subjective:     Date:  2023    Principal Problem:  S/P  section    Hospital Course:  Post-op Day # 1 - s/p primary  at 40w3d secondary to fetal intolerance to labor    Patient c/o crampy incisional pain  No acute events overnight  Denies dizziness, SOB, HESS, or CP  Pain well controlled  Lochia less than menses  Bottle/breast feeding well  Breast non-tender, non-engorged  Ambulating, tolerating diet, and voiding without diffculty   Bonding well with baby    Objective:     Temp:  [97.7 °F (36.5 °C)-99.1 °F (37.3 °C)] 99.1 °F (37.3 °C)  Pulse:  [] 109  Resp:  [16-20] 20  SpO2:  [95 %-100 %] 95 %  BP: (110-147)/(54-78) 123/58    BP (!) 123/58 (BP Location: Left arm, Patient Position: Lying)   Pulse 109   Temp 99.1 °F (37.3 °C) (Oral)   Resp 20   Ht 5' 5" (1.651 m)   Wt 118.5 kg (261 lb 3.9 oz)   LMP 2022   SpO2 95%   Breastfeeding Unknown   BMI 43.47 kg/m²     Intake/Output Summary (Last 24 hours) at 2023 0938  Last data filed at 2023 0500  Gross per 24 hour   Intake 1000 ml   Output 3370 ml   Net -2370 ml   Clear, regis urine    GENERAL:  No acute distress. Alert and oriented x 3.   HEENT:  No scleral icterus. Neck supple. Moist mucus membranes.   LUNGS:  Clear to auscultation bilaterally.   HEART:  Regular rate and rhythm with physiologic heart sounds.   ABDOMEN:  Soft, appropriately tender, non-distended, no guarding, rigidity, or rebound with normoactive bowel sounds.  FUNDUS:  Firm, nontender below the umbilicus.   INCISION: Aquacel bandage clean, dry, & intact.  EXTREMITIES:  No cramping, claudication. Trace edema LE. +2 distal pulses. Symmetric, full range of motion, negative " Juan Manuel.  NEUROLOGIC:  Grossly intact bilaterally.  +2 DTR's.   PSYCH:  Mood and affect appropriate.   PERINEUM:  Intact with light lochia.     Labs:      Recent Results (from the past 336 hour(s))   CBC auto differential    Collection Time: 23  6:39 AM   Result Value Ref Range    WBC 14.38 (H) 3.90 - 12.70 K/uL    Hemoglobin 10.0 (L) 12.0 - 16.0 g/dL    Hematocrit 29.5 (L) 37.0 - 48.5 %    Platelets 265 150 - 450 K/uL   CBC with Auto Differential    Collection Time: 23  4:30 PM   Result Value Ref Range    WBC 9.33 3.90 - 12.70 K/uL    Hemoglobin 11.5 (L) 12.0 - 16.0 g/dL    Hematocrit 34.6 (L) 37.0 - 48.5 %    Platelets 301 150 - 450 K/uL     ABO:  B POS    Assessment:     Post-op day # 1 - IUP at 40w3d s/p primary low transverse  secondary to fetal intolerance to labor - progressing well  Gestational hypertension, asymptomatic, BP at goal on procardia  Anemia, acute blood loss expected postop, asymptomatic    Plan:     Continue post-op care  Review post-partum care instructions and precautions  Advance diet as tolerated  Encourage ambulation, SCD's  Encourage breast-feeding  BP at goal on procarda, will continue to monitor  Iron supplementation when ready  Surgical/delivery findings, labs/studies, and expectations were discussed with pt.  All questions answered and pt voiced understanding.     Disposition:  As patient meets milestones, will plan to discharge.      Vincent Upton MD

## 2023-06-23 VITALS
RESPIRATION RATE: 18 BRPM | SYSTOLIC BLOOD PRESSURE: 131 MMHG | DIASTOLIC BLOOD PRESSURE: 76 MMHG | TEMPERATURE: 99 F | WEIGHT: 261.25 LBS | OXYGEN SATURATION: 97 % | HEIGHT: 65 IN | BODY MASS INDEX: 43.53 KG/M2 | HEART RATE: 74 BPM

## 2023-06-23 PROBLEM — Z3A.40 40 WEEKS GESTATION OF PREGNANCY: Status: RESOLVED | Noted: 2023-06-21 | Resolved: 2023-06-23

## 2023-06-23 PROCEDURE — 25000003 PHARM REV CODE 250: Performed by: OBSTETRICS & GYNECOLOGY

## 2023-06-23 PROCEDURE — 99024 PR POST-OP FOLLOW-UP VISIT: ICD-10-PCS | Mod: ,,, | Performed by: OBSTETRICS & GYNECOLOGY

## 2023-06-23 PROCEDURE — 99024 POSTOP FOLLOW-UP VISIT: CPT | Mod: ,,, | Performed by: OBSTETRICS & GYNECOLOGY

## 2023-06-23 RX ORDER — IBUPROFEN 600 MG/1
600 TABLET ORAL EVERY 6 HOURS PRN
Qty: 40 TABLET | Refills: 0 | Status: SHIPPED | OUTPATIENT
Start: 2023-06-23

## 2023-06-23 RX ORDER — NIFEDIPINE 30 MG/1
30 TABLET, EXTENDED RELEASE ORAL DAILY
Qty: 30 TABLET | Refills: 0 | Status: SHIPPED | OUTPATIENT
Start: 2023-06-23

## 2023-06-23 RX ORDER — FERROUS SULFATE 325(65) MG
325 TABLET, DELAYED RELEASE (ENTERIC COATED) ORAL 2 TIMES DAILY
Qty: 60 TABLET | Refills: 0 | Status: SHIPPED | OUTPATIENT
Start: 2023-06-23

## 2023-06-23 RX ORDER — OXYCODONE AND ACETAMINOPHEN 5; 325 MG/1; MG/1
1 TABLET ORAL EVERY 6 HOURS PRN
Qty: 20 TABLET | Refills: 0 | Status: SHIPPED | OUTPATIENT
Start: 2023-06-23

## 2023-06-23 RX ADMIN — NIFEDIPINE 30 MG: 30 TABLET, FILM COATED, EXTENDED RELEASE ORAL at 08:06

## 2023-06-23 RX ADMIN — IBUPROFEN 800 MG: 400 TABLET ORAL at 05:06

## 2023-06-23 RX ADMIN — IBUPROFEN 800 MG: 400 TABLET ORAL at 03:06

## 2023-06-23 RX ADMIN — MUPIROCIN: 20 OINTMENT TOPICAL at 08:06

## 2023-06-23 RX ADMIN — PRENATAL VIT W/ FE FUMARATE-FA TAB 27-0.8 MG 1 TABLET: 27-0.8 TAB at 08:06

## 2023-06-23 RX ADMIN — DOCUSATE SODIUM 200 MG: 100 CAPSULE, LIQUID FILLED ORAL at 08:06

## 2023-06-23 NOTE — LACTATION NOTE
06/23/23 0845   Maternal Assessment   Breast Density Bilateral:;soft;filling   Areola Bilateral:;elastic   Nipples Bilateral:;everted   Maternal Infant Feeding   Maternal Emotional State independent;relaxed;assist needed   Infant Positioning clutch/football   Signs of Milk Transfer audible swallow;infant jaw motion present   Pain with Feeding yes   Pain Location nipples, bilateral   Pain Description soreness   Latch Assistance yes     Mother with baby cueing to feed -assistance with positioning and latch -baby more on tip of nipple and mother complaint of some soreness -reinforced deep latch for comfort and milk transfer -given lanolin for comfort -encouraged call for any assistance-planning for discharge today

## 2023-06-23 NOTE — PLAN OF CARE
Plan breastfeed on demand at least 8 times in 24 hours and monitor wet and dirty diapers over the next few days

## 2023-06-23 NOTE — PROGRESS NOTES
Called to get an appointment for the patient. Earliest one available was 7/14 with Dr. Salas. Hope, scheduling call center, is sending a message to the clinic to see if an appointment can be made on 7/28 for the patient's dressing removal.

## 2023-06-23 NOTE — PLAN OF CARE
Problem: Adult Inpatient Plan of Care  Goal: Plan of Care Review  Outcome: Met  Goal: Patient-Specific Goal (Individualized)  Outcome: Met  Goal: Absence of Hospital-Acquired Illness or Injury  Outcome: Met  Goal: Optimal Comfort and Wellbeing  Outcome: Met  Goal: Readiness for Transition of Care  Outcome: Met     Problem: Bariatric Environmental Safety  Goal: Safety Maintained with Care  Outcome: Met     Problem:  Fall Injury Risk  Goal: Absence of Fall, Infant Drop and Related Injury  Outcome: Met     Problem: Bleeding (Postpartum  Delivery)  Goal: Hemostasis  Outcome: Met     Problem: Infection (Postpartum  Delivery)  Goal: Absence of Infection Signs and Symptoms  Outcome: Met     Problem: Pain (Postpartum  Delivery)  Goal: Acceptable Pain Control  Outcome: Met

## 2023-06-24 NOTE — DISCHARGE SUMMARY
[facility]    Discharge Summary  Obstetrics & Gynecology      Patient Name:  Renée Rhoades  MRN:  [id number]  Admission Date:  2023  Discharge Date:  2023  Hospital Length of Stay:  4  Attending Physician:  Vincent Upton MD  Discharging Physician:  Vincent Upton MD  Primary Care Provider:  Florencio Pineda MD    Admitting Diagnosis:       Smith IUP at 40w1d   Induction of labor secondary to gestational hypertension  Maternal obesity  Anemia, iron deficiency      Discharge Diagnosis:    Smith IUP at 40w3d s/p repeat low transverse   Gestational hypertension  Maternal obesity  Anemia, iron deficiency      Procedure performed:      Primary low transverse  delivery pfannenstiel skin incision    Brief Hospital Course:      See chart for complete details.      23 y.o.  at 40w3d admitted 2023 for induction of labor secondary to gestational hypertension     Labor course:     2023      40w1d admitted for IOL secondary to gestational hypertension  BP mostly mild range  Induction initiated with oral cytotec     2023     40w2d  No acute events overnight   BP mostly mild range  Denies headaches, vision changes, epigastric pain, or acute swelling  No vaginal bleeding or leakage of fluid  S/p epidural ~930AM  S/p 6 doses of cytotec, pitocin started     2023     40w3d  No acute events overnight  Not much cervical change overnight  Comfortable  Reports active fetus.  Denies vaginal bleeding, leakage of fluid.  Denies headaches, vision changes, epigastric pain, or acute swelling  Pitocin was briefly started this morning on pitocin for labor augmentation and was turned off due to decels  Intrauterine resuscitative measures performed with appropriate response   Pt continue to have recurrent subtle late decels  Fetal tracing and labor course d/w pt and mother at bedside  Discussed Risks, benefits, and alternatives to continued trial of labor versus  " delivery  After an extensive discussion, pt and family would like to proceed with  delivery  All questions answered, voiced understanding.    POD 1-2:  Post-partum course uncomplicated.  Pt underwent a  delivery of a viable infant.  Please see  operative report for further details of the delivery.  Following surgery, pt was transfer to L&D recovery for post-partum care.  Pt had a fairly uncomplicated postpartum course.   Prior to discharge, pt was without major complaints.  Pain was well controlled.  Pt was ambulating, tolerating a regular diet, voiding without difficulty, and bonding well with baby.  Lochia was light.  Vital signs where physiologic and stable.  Physical exam showed no acute findings.  Pt had satisfied routine postpartum discharge criteria and expressed the desire to be discharge from the hospital.     Pertinent Labs or Studies:      Recent Results (from the past 336 hour(s))   CBC auto differential    Collection Time: 23  6:39 AM   Result Value Ref Range    WBC 14.38 (H) 3.90 - 12.70 K/uL    Hemoglobin 10.0 (L) 12.0 - 16.0 g/dL    Hematocrit 29.5 (L) 37.0 - 48.5 %    Platelets 265 150 - 450 K/uL   CBC with Auto Differential    Collection Time: 23  4:30 PM   Result Value Ref Range    WBC 9.33 3.90 - 12.70 K/uL    Hemoglobin 11.5 (L) 12.0 - 16.0 g/dL    Hematocrit 34.6 (L) 37.0 - 48.5 %    Platelets 301 150 - 450 K/uL     ABO:  B POS    Discharge Exam:      Temp:  [97.9 °F (36.6 °C)-98.8 °F (37.1 °C)] 98.5 °F (36.9 °C)  Pulse:  [65-78] 74  Resp:  [18-20] 18  SpO2:  [97 %-99 %] 97 %  BP: (130-134)/(59-76) 131/76    /76 (BP Location: Right arm, Patient Position: Sitting)   Pulse 74   Temp 98.5 °F (36.9 °C) (Oral)   Resp 18   Ht 5' 5" (1.651 m)   Wt 118.5 kg (261 lb 3.9 oz)   LMP 2022   SpO2 97%   Breastfeeding No   BMI 43.47 kg/m²     GENERAL:  No acute distress. Alert and oriented x 3.   HEENT:  Normocephalic. EOMI, PERRLA. No scleral " icterus. Neck supple. Moist mucus membranes.   LUNGS:  Clear to auscultation bilaterally.   HEART:  Regular rate and rhythm with physiologic heart sounds.   ABDOMEN:  Soft, appropriately tender, non-distended, no guarding, rigidity, or rebound.   FUNDUS:  Firm, nontender below the umbilicus.  INCISION:  Aquacel dressing Clean, dry, & intact without signs of infection.   EXTREMITIES:  No cramping, claudication.  Trace edema. Good skin turgor. +2 distal pulses, symmetric. Full range of motion.   NEUROLOGIC:  Grossly intact bilaterally.   PSYCH:  Mood and affect appropriate.   PERINEUM:  Intact with light lochia.    Discharge Condition:  Stable      Discharge Disposition:  Home       Discharge Medications:     Motrin  Percocet  Fergon  Procardia    Discharge Activites:      Resume activities as tolerated with adequate rest  Pelvic rest for 6 weeks   No heavy lifting greater 10 lbs or strenuous activities   Showers only  Wound care instructions and precautions given   Do NOT driving and operating machinery while taking narcotics or other sedative medications  Continue procardia 30 mg XL qday. Check bp tid at home and call office if bp > 160/100 or bp <110/70. Hypertensive/preE precautions reivewed.    Discharge Diet:  Regular diet    Discharge Instructions:      Contact the clinic or emergency department for any concerns including but not limited to an increase in her lochia, abdominal pain, foul vaginal discharge, weakness, decrease activity level, decrease appetite, feeling sad or hopeless, inability to care for her baby, breast pain or infection, difficulty with voiding or having bowel movements, perineal pain or breakdown, wound breakdown, fever >100.4 or abnormal vital signs, shortness of breath, chest pain, dizziness or feeling faint, pain or swelling in her extremities, headaches not relieved with rest and Tylenol, vision changes, seizure activities, abnormal bleeding/bruising, or any other health concerns.   Post-partum care instructions/precautions and hospital course reviewed with the patient prior to discharge.  All of the pt questions were answered, pt voiced understanding.    Follow-up Visit:  1 weeks for postpartum visit, or sooner for any concerns with Dr. Salas.      Vincent Upton MD

## 2023-06-24 NOTE — PROGRESS NOTES
"[facility]    Obstetrics & Gynecology  Progress Note      Patient Name:  Renée Rhoades  MRN:  [id number]  Admission Date:  2023  Hospital Length of Stay:  4  Attending Physician:  Vincent Upton MD  Primary Care Provider:  Florencio Pineda MD    Subjective:     Date:  2023    Principal Problem:  S/P  section    Hospital Course:  Post-op Day # 2 - s/p primary  at 40w3d secondary to fetal intolerance to labor    Pt has no major complaints today  No acute events overnight  Requesting to go home  Denies dizziness, SOB, HESS, or CP  Pain well controlled  Lochia less than menses  Bottle/breast feeding well  Breast non-tender, non-engorged  Ambulating, tolerating diet, and voiding without diffculty   Bonding well with baby    Objective:     Temp:  [97.9 °F (36.6 °C)-98.8 °F (37.1 °C)] 98.5 °F (36.9 °C)  Pulse:  [65-78] 74  Resp:  [18-20] 18  SpO2:  [97 %-99 %] 97 %  BP: (130-134)/(59-76) 131/76    /76 (BP Location: Right arm, Patient Position: Sitting)   Pulse 74   Temp 98.5 °F (36.9 °C) (Oral)   Resp 18   Ht 5' 5" (1.651 m)   Wt 118.5 kg (261 lb 3.9 oz)   LMP 2022   SpO2 97%   Breastfeeding No   BMI 43.47 kg/m²     Clear, regis urine    GENERAL:  No acute distress. Alert and oriented x 3.   HEENT:  No scleral icterus. Neck supple. Moist mucus membranes.   LUNGS:  Clear to auscultation bilaterally.   HEART:  Regular rate and rhythm with physiologic heart sounds.   ABDOMEN:  Soft, appropriately tender, non-distended, no guarding, rigidity, or rebound with normoactive bowel sounds.  FUNDUS:  Firm, nontender below the umbilicus.   INCISION: Aquacel bandage clean, dry, & intact.  EXTREMITIES:  No cramping, claudication. Trace edema LE. +2 distal pulses. Symmetric, full range of motion, negative Zambrano.  NEUROLOGIC:  Grossly intact bilaterally.  +2 DTR's.   PSYCH:  Mood and affect appropriate.   PERINEUM:  Intact with light lochia.     Assessment:     Post-op " day # 2 - IUP at 40w3d s/p primary low transverse  secondary to fetal intolerance to labor - progressing well  Gestational hypertension, asymptomatic, BP at goal on procardia  Anemia, acute blood loss expected postop, asymptomatic    Plan:     Plan for discharge today.     Iron supplementation, anemia precautions    Continue procardia 30 mg XL qday. Check bp tid at home and call office if bp > 160/100 or bp <110/70. Hypertensive/preE precautions reivewed.    Reviewed discharge medications.  Pt was instructed to avoid driving or operate heavy machinery while taking narcotics or other medications with sedative properties.    Post-partum care instructions and precautions, clinical/delivery findings, and hospital course reviewed with pt prior to discharge.  All of her questions were answered to her satisfaction.  Pt voiced understanding and agreeable with discharge.    Return to clinic in 1 weeks for post-partum visit or sooner if any concerns.  Go to ER for any emergencies.      Vincent Upton MD

## 2023-06-26 ENCOUNTER — TELEPHONE (OUTPATIENT)
Dept: OBSTETRICS AND GYNECOLOGY | Facility: HOSPITAL | Age: 23
End: 2023-06-26
Payer: COMMERCIAL

## 2023-06-26 NOTE — TELEPHONE ENCOUNTER
Spoke to pt who states we are doing fine -baby breastfeeding well and milk is in -baby having plenty of wet and dirty diapers and breasts are comfortable -has no questions or concerns for us at this time

## 2023-06-27 ENCOUNTER — TELEPHONE (OUTPATIENT)
Dept: OBSTETRICS AND GYNECOLOGY | Facility: CLINIC | Age: 23
End: 2023-06-27
Payer: COMMERCIAL

## 2023-06-27 NOTE — TELEPHONE ENCOUNTER
Pt call was returned. Advised pt that her caregiver would be granted 2 weeks to help her recover from delivery. Pt verbalized understanding.

## 2023-06-27 NOTE — TELEPHONE ENCOUNTER
----- Message from Sarah Nelson sent at 6/26/2023 11:38 AM CDT -----  Regarding: self [phone]  Type: Patient Call Back    Who called: self     What is the request in detail: pt stated she recently had a c section and is having trouble moving around. Her mother can help her up needs ppw filled out for her job.     Can the clinic reply by [facility]? yes    Would the patient rather a call back or a response via [facility]?  My chart    Best call back number:[phone]      She will upload the paper work to her my chart tomorrow when she gets it.

## 2023-07-07 ENCOUNTER — PATIENT MESSAGE (OUTPATIENT)
Dept: OBSTETRICS AND GYNECOLOGY | Facility: CLINIC | Age: 23
End: 2023-07-07
Payer: COMMERCIAL

## 2023-07-08 ENCOUNTER — HOSPITAL ENCOUNTER (EMERGENCY)
Facility: HOSPITAL | Age: 23
Discharge: HOME | End: 2023-07-08
Attending: EMERGENCY MEDICINE
Payer: COMMERCIAL

## 2023-07-08 VITALS
WEIGHT: 230 LBS | HEART RATE: 83 BPM | RESPIRATION RATE: 16 BRPM | TEMPERATURE: 99 F | OXYGEN SATURATION: 98 % | BODY MASS INDEX: 36.1 KG/M2 | DIASTOLIC BLOOD PRESSURE: 83 MMHG | HEIGHT: 67 IN | SYSTOLIC BLOOD PRESSURE: 139 MMHG

## 2023-07-08 DIAGNOSIS — Z51.89 VISIT FOR WOUND CHECK: Primary | ICD-10-CM

## 2023-07-08 PROCEDURE — 99283 EMERGENCY DEPT VISIT LOW MDM: CPT | Mod: ER

## 2023-07-08 RX ORDER — MUPIROCIN 20 MG/G
OINTMENT TOPICAL 3 TIMES DAILY
Qty: 30 G | Refills: 0 | Status: SHIPPED | OUTPATIENT
Start: 2023-07-08 | End: 2023-07-22

## 2023-07-09 NOTE — DISCHARGE INSTRUCTIONS
§ Please return to the Emergency Department for any new or worsening symptoms including: fever, chest pain, shortness of breath, loss of consciousness, dizziness, weakness, or any other concerns.     § Schedule an appointment for follow up with OB/GYN as soon as possible for a recheck of your symptoms. If you do not have one, contact the one listed on your discharge paperwork or call the [facility] Appointment Desk at [phone] to schedule an appointment.     § If you require follow up care from a specialist and are unable to schedule an appointment with them directly, please contact your Primary Care Provider on the next business day to set up a referral.      § Please take all medication as prescribed.

## 2023-07-09 NOTE — ED PROVIDER NOTES
Encounter Date: 2023    SCRIBE #1 NOTE: I, Madiha Alves, am scribing for, and in the presence of,  PACO Hagen. I have scribed the following portions of the note - Other sections scribed: HPI, ROS, PE.     History     Chief Complaint   Patient presents with    Post-op Problem     Pt reports smell to incision and warm to touch to area x 2 days. Pt delivered 23 . Denies discharge or drainage to the area. Pt wants area evaluated.      Renée Rhoades, 23 y.o. female with a PMHx of Gestational HTN, who presents to the ED with complaints of an odor coming from her  incision onset 2 days ago. Patient reports having a  delivery 17 days ago. Patient states that Dr. Vincent Upton performed the  and there were no complications during the delivery. She denies pain and treatment has not been attempted. Patient denies rash, fever, sore throat, chest pain, SOB, numbness, weakness, tingling, abdominal pain, back pain, dysuria, hematuria, nausea, vomiting, diarrhea, or any other complaints.  No known alleviating or aggravating factors. Patient has a follow up appointment with Dr. Vincent Upton in 1 week. Patient is breastfeeding. Patient has NKDA.       The history is provided by the patient. No  was used.   Review of patient's allergies indicates:  No Known Allergies  Past Medical History:   Diagnosis Date    Anxiety     Depression     Gestational hypertension, third trimester 2023     Past Surgical History:   Procedure Laterality Date     SECTION N/A 2023    Procedure:  SECTION;  Surgeon: Vincent Upton MD;  Location: [facility] L&D OR;  Service: OB/GYN;  Laterality: N/A;     Family History   Problem Relation Age of Onset    Hypertension Mother     Hypertension Maternal Grandmother     Breast cancer Neg Hx     Colon cancer Neg Hx     Diabetes Neg Hx     Ovarian cancer Neg Hx     Stroke Neg Hx      Social History     Tobacco Use    Smoking status:  Never     Passive exposure: Yes    Smokeless tobacco: Never   Substance Use Topics    Alcohol use: Never    Drug use: Never     Review of Systems   Constitutional:  Negative for chills and fever.   HENT:  Negative for congestion, ear pain, rhinorrhea, sore throat and trouble swallowing.    Eyes:  Negative for pain, discharge and redness.   Respiratory:  Negative for cough and shortness of breath.    Cardiovascular:  Negative for chest pain.   Gastrointestinal:  Negative for abdominal pain, diarrhea, nausea and vomiting.   Genitourinary:  Negative for decreased urine volume and dysuria.   Musculoskeletal:  Negative for back pain, neck pain and neck stiffness.   Skin:  Positive for wound. Negative for rash.        (+) Odor to incision   Neurological:  Negative for dizziness, weakness, light-headedness, numbness and headaches.   Psychiatric/Behavioral:  Negative for confusion.      Physical Exam     Initial Vitals [23 1855]   BP Pulse Resp Temp SpO2   139/83 83 16 98.7 °F (37.1 °C) 98 %      MAP       --         Physical Exam    Nursing note and vitals reviewed.  Constitutional: Vital signs are normal. She appears well-developed.  Non-toxic appearance. She does not appear ill.   HENT:   Head: Normocephalic and atraumatic.   Eyes: Conjunctivae are normal.   Neck:   Normal range of motion.  Cardiovascular:  Normal rate and regular rhythm.           Pulmonary/Chest: Effort normal and breath sounds normal. She exhibits no tenderness.   Abdominal: Abdomen is soft. There is no abdominal tenderness.   Musculoskeletal:      Cervical back: Normal range of motion.     Neurological: She is alert and oriented to person, place, and time. Gait normal. GCS eye subscore is 4. GCS verbal subscore is 5. GCS motor subscore is 6.   Skin: Skin is warm, dry and intact. No rash noted. No erythema.    scar noted to the lower abdomen with a small open area and remainder well healed. No erythema, tenderness, swelling, or drainage  noted   Psychiatric: She has a normal mood and affect. Her speech is normal and behavior is normal. Judgment and thought content normal.       ED Course   Procedures  Labs Reviewed - No data to display         Imaging Results    None          Medications - No data to display  Medical Decision Making:   History:   Old Medical Records: I decided to obtain old medical records.  Clinical Tests:   Lab Tests: Ordered and Reviewed     APC / Resident Notes:   This is an evaluation of a 23 y.o. female that presents to the Emergency Department for a wound check. Physical Exam shows a non-toxic, afebrile, and well appearing female. There is  scar noted to the lower abdomen with a small open area and remainder well healed. No erythema, tenderness, swelling, or drainage noted. There is no active drainage. No pain/tenderness outside of erythematous region. No areas of bruising, bullae, or crepitus.    Vital Signs Are Reassuring.     My overall impression is Abscess of the wound check. I considered, but at this time, do not suspect an extensive cellulitis, sepsis, necrotizing fasciitis, or bacteremia.     ED Course: wound cleaned and dressed with mupirocin. D/C Meds: mupirocin. D/C Information: Wound care. The diagnosis, treatment plan, instructions for follow-up and reevaluation with her OBGYN for a wound recheck as well as ED return precautions were discussed and understanding was verbalized. All questions or concerns have been addressed.      Scribe Attestation:   Scribe #1: I performed the above scribed service and the documentation accurately describes the services I performed. I attest to the accuracy of the note.                 I, SANJAY Hagen, personally performed the services described in this documentation.  All medical record entries made by the scribe were at my direction and in my presence.  I have reviewed the chart and agree that the record reflects my personal performance and is accurate and  complete.   Clinical Impression:   Final diagnoses:  [Z51.89] Visit for wound check (Primary)        ED Disposition Condition    Discharge Stable          ED Prescriptions       Medication Sig Dispense Start Date End Date Auth. Provider    mupirocin (BACTROBAN) 2 % ointment Apply topically 3 (three) times daily. for 14 days 30 g 7/8/2023 7/22/2023 PACO Seay          Follow-up Information       Follow up With Specialties Details Why Contact Info    Reji Salas MD Obstetrics, Obstetrics and Gynecology Go to  as scheduled [address]  [address]  [facility] [address] [phone]      [facility] ED Emergency Medicine Go to  If symptoms worsen [address] [facility] [address] [phone]             PACO Seay  07/08/23 2024

## 2023-07-12 ENCOUNTER — PATIENT MESSAGE (OUTPATIENT)
Dept: OBSTETRICS AND GYNECOLOGY | Facility: CLINIC | Age: 23
End: 2023-07-12
Payer: COMMERCIAL

## 2023-07-13 ENCOUNTER — PATIENT MESSAGE (OUTPATIENT)
Dept: OBSTETRICS AND GYNECOLOGY | Facility: CLINIC | Age: 23
End: 2023-07-13
Payer: COMMERCIAL

## 2023-07-17 ENCOUNTER — OFFICE VISIT (OUTPATIENT)
Dept: OBSTETRICS AND GYNECOLOGY | Facility: CLINIC | Age: 23
End: 2023-07-17
Payer: COMMERCIAL

## 2023-07-17 VITALS
WEIGHT: 224.88 LBS | DIASTOLIC BLOOD PRESSURE: 74 MMHG | BODY MASS INDEX: 35.22 KG/M2 | SYSTOLIC BLOOD PRESSURE: 122 MMHG

## 2023-07-17 DIAGNOSIS — Z30.014 ENCOUNTER FOR INITIAL PRESCRIPTION OF INTRAUTERINE CONTRACEPTIVE DEVICE (IUD): ICD-10-CM

## 2023-07-17 PROCEDURE — 1159F MED LIST DOCD IN RCRD: CPT | Mod: CPTII,S$GLB,, | Performed by: OBSTETRICS & GYNECOLOGY

## 2023-07-17 PROCEDURE — 99214 PR OFFICE/OUTPT VISIT, EST, LEVL IV, 30-39 MIN: ICD-10-PCS | Mod: S$GLB,,, | Performed by: OBSTETRICS & GYNECOLOGY

## 2023-07-17 PROCEDURE — 3078F DIAST BP <80 MM HG: CPT | Mod: CPTII,S$GLB,, | Performed by: OBSTETRICS & GYNECOLOGY

## 2023-07-17 PROCEDURE — 99999 PR PBB SHADOW E&M-EST. PATIENT-LVL III: ICD-10-PCS | Mod: PBBFAC,,, | Performed by: OBSTETRICS & GYNECOLOGY

## 2023-07-17 PROCEDURE — 99214 OFFICE O/P EST MOD 30 MIN: CPT | Mod: S$GLB,,, | Performed by: OBSTETRICS & GYNECOLOGY

## 2023-07-17 PROCEDURE — 3078F PR MOST RECENT DIASTOLIC BLOOD PRESSURE < 80 MM HG: ICD-10-PCS | Mod: CPTII,S$GLB,, | Performed by: OBSTETRICS & GYNECOLOGY

## 2023-07-17 PROCEDURE — 1159F PR MEDICATION LIST DOCUMENTED IN MEDICAL RECORD: ICD-10-PCS | Mod: CPTII,S$GLB,, | Performed by: OBSTETRICS & GYNECOLOGY

## 2023-07-17 PROCEDURE — 1111F PR DISCHARGE MEDS RECONCILED W/ CURRENT OUTPATIENT MED LIST: ICD-10-PCS | Mod: CPTII,S$GLB,, | Performed by: OBSTETRICS & GYNECOLOGY

## 2023-07-17 PROCEDURE — 3008F PR BODY MASS INDEX (BMI) DOCUMENTED: ICD-10-PCS | Mod: CPTII,S$GLB,, | Performed by: OBSTETRICS & GYNECOLOGY

## 2023-07-17 PROCEDURE — 99999 PR PBB SHADOW E&M-EST. PATIENT-LVL III: CPT | Mod: PBBFAC,,, | Performed by: OBSTETRICS & GYNECOLOGY

## 2023-07-17 PROCEDURE — 3074F SYST BP LT 130 MM HG: CPT | Mod: CPTII,S$GLB,, | Performed by: OBSTETRICS & GYNECOLOGY

## 2023-07-17 PROCEDURE — 3008F BODY MASS INDEX DOCD: CPT | Mod: CPTII,S$GLB,, | Performed by: OBSTETRICS & GYNECOLOGY

## 2023-07-17 PROCEDURE — 3074F PR MOST RECENT SYSTOLIC BLOOD PRESSURE < 130 MM HG: ICD-10-PCS | Mod: CPTII,S$GLB,, | Performed by: OBSTETRICS & GYNECOLOGY

## 2023-07-17 PROCEDURE — 1111F DSCHRG MED/CURRENT MED MERGE: CPT | Mod: CPTII,S$GLB,, | Performed by: OBSTETRICS & GYNECOLOGY

## 2023-07-17 RX ORDER — SERTRALINE HYDROCHLORIDE 25 MG/1
25 TABLET, FILM COATED ORAL DAILY
Qty: 90 TABLET | Refills: 2 | Status: SHIPPED | OUTPATIENT
Start: 2023-07-17 | End: 2023-07-17 | Stop reason: SDUPTHER

## 2023-07-17 RX ORDER — SERTRALINE HYDROCHLORIDE 25 MG/1
25 TABLET, FILM COATED ORAL DAILY
Qty: 90 TABLET | Refills: 2 | Status: SHIPPED | OUTPATIENT
Start: 2023-07-17 | End: 2024-07-16

## 2023-07-17 NOTE — PROGRESS NOTES
Cc:  postop check    HPI:  23 yr  who had LTCS on 23 for non-reassuring fetal status.  Csec done without complication and routine pp course.  Pt admits she has been feeling overwhelmed and pt's mother agrees and admits her mood is worse than pt is willing to admit.  Pt states she would accept medication for depression.  Pt denies any SI or HI.  Pt has support from her mother and FOB.    Vitals:    23 1549   BP: 122/74   Weight: 102 kg (224 lb 13.9 oz)     Abd:  soft NTND  Wound:  healing well    Assessment/Plan  1. Postpartum depression    2. Encounter for initial prescription of intrauterine contraceptive device (IUD)      Rx Zoloft 25 mg qd, f/u 3 wks for pp exam and discuss meds  Mirena to be ordered and placed at time of pp exam

## 2023-08-15 ENCOUNTER — POSTPARTUM VISIT (OUTPATIENT)
Dept: OBSTETRICS AND GYNECOLOGY | Facility: CLINIC | Age: 23
End: 2023-08-15
Payer: COMMERCIAL

## 2023-08-15 ENCOUNTER — LAB VISIT (OUTPATIENT)
Dept: LAB | Facility: HOSPITAL | Age: 23
End: 2023-08-15
Attending: OBSTETRICS & GYNECOLOGY
Payer: COMMERCIAL

## 2023-08-15 VITALS
SYSTOLIC BLOOD PRESSURE: 120 MMHG | BODY MASS INDEX: 37.77 KG/M2 | DIASTOLIC BLOOD PRESSURE: 72 MMHG | WEIGHT: 241.19 LBS

## 2023-08-15 DIAGNOSIS — Z01.419 GYNECOLOGIC EXAM NORMAL: ICD-10-CM

## 2023-08-15 DIAGNOSIS — Z72.89 OTHER PROBLEMS RELATED TO LIFESTYLE: ICD-10-CM

## 2023-08-15 DIAGNOSIS — Z11.3 SCREEN FOR STD (SEXUALLY TRANSMITTED DISEASE): ICD-10-CM

## 2023-08-15 LAB — HCG INTACT+B SERPL-ACNC: <1.2 MIU/ML

## 2023-08-15 PROCEDURE — 84702 CHORIONIC GONADOTROPIN TEST: CPT | Performed by: OBSTETRICS & GYNECOLOGY

## 2023-08-15 PROCEDURE — 99999 PR PBB SHADOW E&M-EST. PATIENT-LVL II: CPT | Mod: PBBFAC,,, | Performed by: OBSTETRICS & GYNECOLOGY

## 2023-08-15 PROCEDURE — 88175 CYTOPATH C/V AUTO FLUID REDO: CPT | Performed by: OBSTETRICS & GYNECOLOGY

## 2023-08-15 PROCEDURE — 36415 COLL VENOUS BLD VENIPUNCTURE: CPT | Performed by: OBSTETRICS & GYNECOLOGY

## 2023-08-15 PROCEDURE — 0503F POSTPARTUM CARE VISIT: CPT | Mod: S$GLB,,, | Performed by: OBSTETRICS & GYNECOLOGY

## 2023-08-15 PROCEDURE — 87591 N.GONORRHOEAE DNA AMP PROB: CPT | Performed by: OBSTETRICS & GYNECOLOGY

## 2023-08-15 PROCEDURE — 99999 PR PBB SHADOW E&M-EST. PATIENT-LVL II: ICD-10-PCS | Mod: PBBFAC,,, | Performed by: OBSTETRICS & GYNECOLOGY

## 2023-08-15 PROCEDURE — 0503F PR POSTPARTUM CARE VISIT: ICD-10-PCS | Mod: S$GLB,,, | Performed by: OBSTETRICS & GYNECOLOGY

## 2023-08-15 NOTE — PROGRESS NOTES
23 y.o. female for postpartum visit.  Patient has no complaints.  Her delivery records were reviewed.  She is bottle feeding infant.  Pt previously dx'd with pp depression and RX for Zoloft 25 mg given.  Pt states this dose has been effective for her and she desires to continue.  Pt has good support with infant.  Pt has been sexually active since delivery with no contraception.  Pt desires Mirena IUD for contraception.    Vitals:    08/15/23 1416   BP: 120/72   Weight: 109.4 kg (241 lb 2.9 oz)         Exam  General - well appearing, no apparent distress  Abdomen - soft, non tender, non distended incision none.  Pelvic - normal external genitalia, any lacerations well healed               uterus non tender, appropriately sized  Extremeties - no edema    Assessment:  Encounter Diagnoses   Name Primary?    Postpartum exam Yes    Gynecologic exam normal     Screen for STD (sexually transmitted disease)     Other problems related to lifestyle         Pap and GC/CT collected  Pt to continue Zoloft 25 mg qd x 6 months  Serum HCG today and stressed no intercourse until IUD placement

## 2023-08-16 LAB
C TRACH DNA SPEC QL NAA+PROBE: NOT DETECTED
N GONORRHOEA DNA SPEC QL NAA+PROBE: NOT DETECTED

## 2023-08-23 LAB
FINAL PATHOLOGIC DIAGNOSIS: NORMAL
Lab: NORMAL

## 2023-09-25 PROBLEM — O13.3 GESTATIONAL HYPERTENSION, THIRD TRIMESTER: Status: RESOLVED | Noted: 2023-06-21 | Resolved: 2023-09-25

## 2025-01-08 ENCOUNTER — PATIENT MESSAGE (OUTPATIENT)
Dept: RESEARCH | Facility: OTHER | Age: 25
End: 2025-01-08
Payer: COMMERCIAL

## (undated) DEVICE — DRESSING AQUACEL AG 3.5X10IN